# Patient Record
Sex: FEMALE | Race: WHITE | HISPANIC OR LATINO | URBAN - METROPOLITAN AREA
[De-identification: names, ages, dates, MRNs, and addresses within clinical notes are randomized per-mention and may not be internally consistent; named-entity substitution may affect disease eponyms.]

---

## 2019-12-27 ENCOUNTER — EMERGENCY (EMERGENCY)
Facility: HOSPITAL | Age: 39
LOS: 1 days | Discharge: HOME | End: 2019-12-27
Attending: EMERGENCY MEDICINE | Admitting: EMERGENCY MEDICINE
Payer: SELF-PAY

## 2019-12-27 VITALS
SYSTOLIC BLOOD PRESSURE: 199 MMHG | HEART RATE: 82 BPM | OXYGEN SATURATION: 97 % | TEMPERATURE: 98 F | WEIGHT: 147.05 LBS | RESPIRATION RATE: 20 BRPM | HEIGHT: 62 IN | DIASTOLIC BLOOD PRESSURE: 94 MMHG

## 2019-12-27 VITALS
RESPIRATION RATE: 20 BRPM | OXYGEN SATURATION: 98 % | DIASTOLIC BLOOD PRESSURE: 87 MMHG | HEART RATE: 76 BPM | SYSTOLIC BLOOD PRESSURE: 153 MMHG

## 2019-12-27 VITALS
HEART RATE: 56 BPM | DIASTOLIC BLOOD PRESSURE: 95 MMHG | TEMPERATURE: 98 F | SYSTOLIC BLOOD PRESSURE: 159 MMHG | OXYGEN SATURATION: 97 % | WEIGHT: 147.93 LBS | RESPIRATION RATE: 17 BRPM | HEIGHT: 62 IN

## 2019-12-27 LAB
ALBUMIN SERPL ELPH-MCNC: 3.7 G/DL — SIGNIFICANT CHANGE UP (ref 3.3–5)
ALP SERPL-CCNC: 73 U/L — SIGNIFICANT CHANGE UP (ref 40–120)
ALT FLD-CCNC: 25 U/L — SIGNIFICANT CHANGE UP (ref 10–45)
ANION GAP SERPL CALC-SCNC: 11 MMOL/L — SIGNIFICANT CHANGE UP (ref 5–17)
AST SERPL-CCNC: 8 U/L — LOW (ref 10–40)
BILIRUB SERPL-MCNC: 0.2 MG/DL — SIGNIFICANT CHANGE UP (ref 0.2–1.2)
BUN SERPL-MCNC: 8 MG/DL — SIGNIFICANT CHANGE UP (ref 7–23)
CALCIUM SERPL-MCNC: 9 MG/DL — SIGNIFICANT CHANGE UP (ref 8.4–10.5)
CHLORIDE SERPL-SCNC: 106 MMOL/L — SIGNIFICANT CHANGE UP (ref 96–108)
CO2 SERPL-SCNC: 25 MMOL/L — SIGNIFICANT CHANGE UP (ref 22–31)
CREAT SERPL-MCNC: 0.98 MG/DL — SIGNIFICANT CHANGE UP (ref 0.5–1.3)
D DIMER BLD IA.RAPID-MCNC: <150 NG/ML DDU — SIGNIFICANT CHANGE UP
GLUCOSE SERPL-MCNC: 133 MG/DL — HIGH (ref 70–99)
HCT VFR BLD CALC: 37 % — SIGNIFICANT CHANGE UP (ref 34.5–45)
HGB BLD-MCNC: 12.4 G/DL — SIGNIFICANT CHANGE UP (ref 11.5–15.5)
MCHC RBC-ENTMCNC: 28.5 PG — SIGNIFICANT CHANGE UP (ref 27–34)
MCHC RBC-ENTMCNC: 33.5 GM/DL — SIGNIFICANT CHANGE UP (ref 32–36)
MCV RBC AUTO: 85.1 FL — SIGNIFICANT CHANGE UP (ref 80–100)
NRBC # BLD: 0 /100 WBCS — SIGNIFICANT CHANGE UP (ref 0–0)
PLATELET # BLD AUTO: 292 K/UL — SIGNIFICANT CHANGE UP (ref 150–400)
POTASSIUM SERPL-MCNC: 3.2 MMOL/L — LOW (ref 3.5–5.3)
POTASSIUM SERPL-SCNC: 3.2 MMOL/L — LOW (ref 3.5–5.3)
PROT SERPL-MCNC: 7.2 G/DL — SIGNIFICANT CHANGE UP (ref 6–8.3)
RBC # BLD: 4.35 M/UL — SIGNIFICANT CHANGE UP (ref 3.8–5.2)
RBC # FLD: 12.6 % — SIGNIFICANT CHANGE UP (ref 10.3–14.5)
SODIUM SERPL-SCNC: 142 MMOL/L — SIGNIFICANT CHANGE UP (ref 135–145)
TROPONIN I SERPL-MCNC: <.017 NG/ML — LOW (ref 0.02–0.06)
WBC # BLD: 10.67 K/UL — HIGH (ref 3.8–10.5)
WBC # FLD AUTO: 10.67 K/UL — HIGH (ref 3.8–10.5)

## 2019-12-27 PROCEDURE — 85379 FIBRIN DEGRADATION QUANT: CPT

## 2019-12-27 PROCEDURE — 99285 EMERGENCY DEPT VISIT HI MDM: CPT

## 2019-12-27 PROCEDURE — 71046 X-RAY EXAM CHEST 2 VIEWS: CPT | Mod: 26

## 2019-12-27 PROCEDURE — 93005 ELECTROCARDIOGRAM TRACING: CPT

## 2019-12-27 PROCEDURE — 99284 EMERGENCY DEPT VISIT MOD MDM: CPT | Mod: 25

## 2019-12-27 PROCEDURE — 71046 X-RAY EXAM CHEST 2 VIEWS: CPT

## 2019-12-27 PROCEDURE — 84484 ASSAY OF TROPONIN QUANT: CPT

## 2019-12-27 PROCEDURE — 85027 COMPLETE CBC AUTOMATED: CPT

## 2019-12-27 PROCEDURE — 80053 COMPREHEN METABOLIC PANEL: CPT

## 2019-12-27 PROCEDURE — 99284 EMERGENCY DEPT VISIT MOD MDM: CPT

## 2019-12-27 PROCEDURE — 93010 ELECTROCARDIOGRAM REPORT: CPT

## 2019-12-27 PROCEDURE — 99053 MED SERV 10PM-8AM 24 HR FAC: CPT

## 2019-12-27 RX ORDER — METOPROLOL TARTRATE 50 MG
50 TABLET ORAL ONCE
Refills: 0 | Status: COMPLETED | OUTPATIENT
Start: 2019-12-27 | End: 2019-12-27

## 2019-12-27 RX ORDER — METOPROLOL TARTRATE 50 MG
1 TABLET ORAL
Qty: 30 | Refills: 0
Start: 2019-12-27 | End: 2020-01-10

## 2019-12-27 RX ORDER — ASPIRIN/CALCIUM CARB/MAGNESIUM 324 MG
162 TABLET ORAL DAILY
Refills: 0 | Status: DISCONTINUED | OUTPATIENT
Start: 2019-12-27 | End: 2020-01-11

## 2019-12-27 RX ADMIN — Medication 162 MILLIGRAM(S): at 01:10

## 2019-12-27 RX ADMIN — Medication 50 MILLIGRAM(S): at 01:10

## 2019-12-27 NOTE — ED ADULT TRIAGE NOTE - PRO INTERPRETER NEED 2
Vincentian [FreeTextEntry1] : Complete pulmonary function tests show normal spirometry, lung volumes and flow-volume loop. Diffusing capacity is normal.

## 2019-12-27 NOTE — ED PROVIDER NOTE - OBJECTIVE STATEMENT
pt reports intermittent chest pressure, sob, tingling in the chest and abdomen and b/l hands, she gets this periodically for last several months but has had no evaluation or treatment, pt recently here from [place], reports no medical problems but has not seen a doctor in her country.

## 2019-12-27 NOTE — ED ADULT NURSE NOTE - OBJECTIVE STATEMENT
Pt is alert, came to the ER due to nausea and epigastric pain since 5 to 6 days now. Pt was seen here yesterday for the same problem, was prescribed Metoprolol for the BP.

## 2019-12-27 NOTE — ED ADULT TRIAGE NOTE - CHIEF COMPLAINT QUOTE
Pt seen here last night, stated she is not feeling better, gets epigastric pain when she is unable to burp, also stated her BP is still elevated

## 2019-12-27 NOTE — ED PROVIDER NOTE - NSFOLLOWUPCLINICS_GEN_ALL_ED_FT
[facility] Practice Clinic  Family Medicine  [address] [place] [id number]  Phone: [phone]  Fax:   Follow Up Time:

## 2019-12-27 NOTE — ED ADULT NURSE NOTE - NSIMPLEMENTINTERV_GEN_ALL_ED
Implemented All Universal Safety Interventions:  [place] to call system. Call bell, personal items and telephone within reach. Instruct patient to call for assistance. Room bathroom lighting operational. Non-slip footwear when patient is off stretcher. Physically safe environment: no spills, clutter or unnecessary equipment. Stretcher in lowest position, wheels locked, appropriate side rails in place.

## 2019-12-27 NOTE — ED PROVIDER NOTE - PATIENT PORTAL LINK FT
You can access the FollowMyHealth Patient Portal offered by [facility] by registering at the following website: [email or web address]. By joining [facility]’s FollowMyHealth portal, you will also be able to view your health information using other applications (apps) compatible with our system.

## 2019-12-27 NOTE — ED ADULT TRIAGE NOTE - CHIEF COMPLAINT QUOTE
Pt presents to the ER complaining of chest pressure that started since Monday. Pt states that she feels pressure on her chest followed by SOB and abdominal pain. Pt is unable to take deep breaths after this episodes that last about 30 min. Pt with PMH hypertension.

## 2019-12-28 VITALS
SYSTOLIC BLOOD PRESSURE: 145 MMHG | OXYGEN SATURATION: 100 % | DIASTOLIC BLOOD PRESSURE: 85 MMHG | HEART RATE: 70 BPM | RESPIRATION RATE: 16 BRPM

## 2019-12-28 PROBLEM — I10 ESSENTIAL (PRIMARY) HYPERTENSION: Chronic | Status: ACTIVE | Noted: 2019-12-27

## 2019-12-28 LAB
ANION GAP SERPL CALC-SCNC: 11 MMOL/L — SIGNIFICANT CHANGE UP (ref 5–17)
BUN SERPL-MCNC: 10 MG/DL — SIGNIFICANT CHANGE UP (ref 7–23)
CALCIUM SERPL-MCNC: 8.7 MG/DL — SIGNIFICANT CHANGE UP (ref 8.4–10.5)
CHLORIDE SERPL-SCNC: 108 MMOL/L — SIGNIFICANT CHANGE UP (ref 96–108)
CO2 SERPL-SCNC: 25 MMOL/L — SIGNIFICANT CHANGE UP (ref 22–31)
CREAT SERPL-MCNC: 0.57 MG/DL — SIGNIFICANT CHANGE UP (ref 0.5–1.3)
GLUCOSE SERPL-MCNC: 106 MG/DL — HIGH (ref 70–99)
HCT VFR BLD CALC: 39 % — SIGNIFICANT CHANGE UP (ref 34.5–45)
HGB BLD-MCNC: 12.7 G/DL — SIGNIFICANT CHANGE UP (ref 11.5–15.5)
MCHC RBC-ENTMCNC: 28.1 PG — SIGNIFICANT CHANGE UP (ref 27–34)
MCHC RBC-ENTMCNC: 32.6 GM/DL — SIGNIFICANT CHANGE UP (ref 32–36)
MCV RBC AUTO: 86.3 FL — SIGNIFICANT CHANGE UP (ref 80–100)
NRBC # BLD: 0 /100 WBCS — SIGNIFICANT CHANGE UP (ref 0–0)
PLATELET # BLD AUTO: 302 K/UL — SIGNIFICANT CHANGE UP (ref 150–400)
POTASSIUM SERPL-MCNC: 3.6 MMOL/L — SIGNIFICANT CHANGE UP (ref 3.5–5.3)
POTASSIUM SERPL-SCNC: 3.6 MMOL/L — SIGNIFICANT CHANGE UP (ref 3.5–5.3)
RBC # BLD: 4.52 M/UL — SIGNIFICANT CHANGE UP (ref 3.8–5.2)
RBC # FLD: 12.4 % — SIGNIFICANT CHANGE UP (ref 10.3–14.5)
SODIUM SERPL-SCNC: 144 MMOL/L — SIGNIFICANT CHANGE UP (ref 135–145)
TROPONIN I SERPL-MCNC: <.017 NG/ML — LOW (ref 0.02–0.06)
WBC # BLD: 9.82 K/UL — SIGNIFICANT CHANGE UP (ref 3.8–10.5)
WBC # FLD AUTO: 9.82 K/UL — SIGNIFICANT CHANGE UP (ref 3.8–10.5)

## 2019-12-28 PROCEDURE — 36415 COLL VENOUS BLD VENIPUNCTURE: CPT

## 2019-12-28 PROCEDURE — 80048 BASIC METABOLIC PNL TOTAL CA: CPT

## 2019-12-28 PROCEDURE — 85027 COMPLETE CBC AUTOMATED: CPT

## 2019-12-28 PROCEDURE — 84484 ASSAY OF TROPONIN QUANT: CPT

## 2019-12-28 PROCEDURE — 99284 EMERGENCY DEPT VISIT MOD MDM: CPT | Mod: 25

## 2019-12-28 PROCEDURE — 93010 ELECTROCARDIOGRAM REPORT: CPT

## 2019-12-28 PROCEDURE — 93005 ELECTROCARDIOGRAM TRACING: CPT

## 2019-12-28 RX ORDER — FAMOTIDINE 10 MG/ML
20 INJECTION INTRAVENOUS ONCE
Refills: 0 | Status: COMPLETED | OUTPATIENT
Start: 2019-12-28 | End: 2019-12-28

## 2019-12-28 RX ORDER — OMEPRAZOLE 10 MG/1
1 CAPSULE, DELAYED RELEASE ORAL
Qty: 30 | Refills: 0
Start: 2019-12-28

## 2019-12-28 RX ORDER — LIDOCAINE 4 G/100G
10 CREAM TOPICAL ONCE
Refills: 0 | Status: COMPLETED | OUTPATIENT
Start: 2019-12-28 | End: 2019-12-28

## 2019-12-28 RX ADMIN — Medication 30 MILLILITER(S): at 00:20

## 2019-12-28 RX ADMIN — FAMOTIDINE 20 MILLIGRAM(S): 10 INJECTION INTRAVENOUS at 00:20

## 2019-12-28 RX ADMIN — LIDOCAINE 10 MILLILITER(S): 4 CREAM TOPICAL at 00:18

## 2019-12-28 NOTE — ED PROVIDER NOTE - OBJECTIVE STATEMENT
pt c/o epigastric pain, mild, pressure, bloating for about 10days, assoc c sob, worsens after eating. no nausea/voimting. no fever/cough. no leg pain/swelling /recent periods of immobility. no black/bloody stools. was seen here for same sxs yesterday.  sxs persisting, unchanged. was prescribed metoprolol which pt has started

## 2019-12-28 NOTE — ED PROVIDER NOTE - CARE PLAN
Principal Discharge DX:	Epigastric pain  Secondary Diagnosis:	Dyspnea, unspecified type Principal Discharge DX:	Epigastric pain  Secondary Diagnosis:	Dyspnea, unspecified type  Secondary Diagnosis:	PVC (premature ventricular contraction)

## 2019-12-28 NOTE — ED PROVIDER NOTE - CARE PROVIDER_API CALL
Jovany Williamson)  Cardiology; Internal Medicine  [address] [facility], [address]  [place] [address]  Phone: [phone]  Fax: [phone]  Follow Up Time: 1-3 Days

## 2019-12-28 NOTE — ED PROVIDER NOTE - CLINICAL SUMMARY MEDICAL DECISION MAKING FREE TEXT BOX
epigastric pain, sob, worse c eating. no cardiac risk factors. possible gastritis, GERD.  w/u neg yesterday for PE, acs, LFT's normal. will treat for gerd/gastritis. give pepcid, maalox,  will rx omeprazole.  recheck EKG    update: pt feeling better p meds. ekg today changed, shows frequent PVC, bigeminy like pattern.  lytes, trop rechecked, normal.  pt stable. no sxs.  continue metoprolol. f/u pmd and cardiology

## 2019-12-28 NOTE — ED PROVIDER NOTE - PATIENT PORTAL LINK FT
You can access the FollowMyHealth Patient Portal offered by [facility] by registering at the following website: [email or web address]. By joining [email or web address]’s FollowMyHealth portal, you will also be able to view your health information using other applications (apps) compatible with our system. You can access the FollowMyHealth Patient Portal offered by [facility] by registering at the following website: [email or web address]. By joining [facility]’s FollowMyHealth portal, you will also be able to view your health information using other applications (apps) compatible with our system.

## 2019-12-28 NOTE — ED PROVIDER NOTE - NSFOLLOWUPCLINICS_GEN_ALL_ED_FT
Family Practice Clinic  Family Medicine  [address] [place] [id number]  Phone: [phone]  Fax:   Follow Up Time: 1-3 Days

## 2019-12-28 NOTE — ED PROVIDER NOTE - NSDCPRINTRESULTS_ED_ALL_ED
Here is the number for the coumadin clinic:  [phone].    Thank you for your visit today.  Please call me with any questions or concerns.   Carloz Wilson RN  Cardiology Care Coordinator  [phone], press option 1 then option 3    
Patient requests all Lab and Radiology Results on their Discharge Instructions

## 2019-12-28 NOTE — ED PROVIDER NOTE - NSFOLLOWUPINSTRUCTIONS_ED_ALL_ED_FT
-take omeprazole daily    Falta de aliento    LO QUE NECESITA SABER:    La falta de aliento es la sensación de que no puede obtener suficiente aire cuando respira. Usted puede tener ángel sentimiento solo quiana la actividad, o todo el tiempo. Los síntomas pueden variar de leves a severos. La falta de aliento puede ser un signo de hung condición de georgina grave que requiere atención inmediata.    INSTRUCCIONES SOBRE [facility]:    Regrese a la sasha de emergencias si:    Ayaka signos y síntomas están igual o empeoran en las siguientes 24 horas del tratamiento.      La piel sobre ayaka costillas o en reyes anisha se hunden cuando usted respira.      Usted se siente confundido o mareado    Comuníquese con reyes médico si:    Usted tiene nuevos síntomas o ayaka síntomas empeoran.      Usted tiene preguntas o inquietudes acerca de reyes condición o cuidado.    Medicamentos:    Los medicamentosLos medicamentos se pueden usar para encontrar la causa de los síntomas. Usted podría necesitar medicamentos para tratar hung infección bacteriana o para reducir la ansiedad. Otros medicamentos pueden utilizarse para abrir las vías respiratorias, desinflamar o quitar líquido extra. Si usted tiene hung condición del corazón, puede necesitar medicamentos para ayudar a que reyes corazón palpite más astrid o regularmente.      [place] ayaka medicamentos john se le haya indicado.Consulte con reyes médico si usted lebron que reyes medicamento no le está ayudando o si presenta efectos secundarios. Infórmele si es alérgico a cualquier medicamento. Mantenga hung lista actualizada de los medicamentos, las vitaminas y los productos herbales que tala. Incluya los siguientes datos de los medicamentos: cantidad, frecuencia y motivo de administración. Traiga con usted la lista o los envases de las píldoras a ayaka citas de seguimiento. Lleve la lista de los medicamentos con usted en tom de hung emergencia.    Maneje la falta de aliento:    El[place]ore un plan de acción.Usted y reyes médico pueden trabajar juntos a fin de crear un plan para manejar la falta de aliento. El plan puede incluir las actividades diarias, cambios de tratamiento y qué hacer si usted tiene problemas respiratorios graves.      Al jos asiento inclínese hacia adelante en ayaka codos.[place] ayuda a expandir los pulmones y puede que le sea más fácil respirar.      Use la respiración con los labios fruncidos cada vez que se sienta corto de respiración.Respire por la nariz y muy despacio exhale por reyes boca con los labios ligeramente fruncidos o en forma de ''U''. Se debería demorar el doble de tiempo al expulsar el aire de lo que le michelle en inhalarlo.Inhalar y exhalar           No fume.La nicotina y otras sustancias químicas que contienen los cigarrillos y puros pueden causar daño pulmonar y empeorar la falta de aliento. Pida información a reyes médico si usted actualmente fuma y necesita ayuda para dejar de fumar. Los cigarrillos electrónicos o el tabaco sin humo igualmente contienen nicotina. Consulte con reyes médico antes de utilizar estos productos.      [place] o mantenga un peso saludable.Reyes médico le puede ayudar a elaborar un plan para perder peso de hung forma ralph si usted tiene sobrepeso.      Ejercítese según indicaciones.El ejercicio puede ayudar a los pulmones a trabajar más fácilmente. El ejercicio también puede ayudar a perder peso, si es necesario. Trate de hacer unos 30 minutos de ejercicio la mayoría de los días de la semana.    Kasandra hung adily de control con reyes médico o especialista john se lo indicaron:Anote ayaka preguntas para que se acuerde de hacerlas quiana ayaka visitas.    Dolor epigástrico    LO QUE NECESITA SABER:    El dolor epigástrico se siente en la parte media superior del abdomen entre las costillas y el ombligo. El dolor puede ser leve o intenso. El dolor se puede propagar de un lado a otro lugar del cuerpo. El dolor epigástrico puede ser hung señal de un problema grave que necesita recibir tratamiento.    INSTRUCCIONES SOBRE [facility]ITAL[facility]IA:    Llame al 911 en tom de presentar lo siguiente:    Tiene alguno de los siguientes signos de un ataque cardíaco:   Estrujamiento, presión o tensión en reyes pecho      Usted también podría presentar alguno de los siguientes:   Malestar o dolor en reyes espalda, anisha, mandíbula, abdomen, o brazo      Falta de aliento      Náuseas o vómitos      Desvanecimiento o sudor frío repentino      Usted tiene un astrid dolor que se irradia a reyes mandíbula o a reyes espalda.    Regrese a la sasha de emergencias si:    Usted tiene un astrid dolor que empieza de repente y empeora rápidamente.      Usted no puede tener hung evacuación intestinal y está vomitando.      Usted vomita o expectora dariel.      Usted nota dariel en reyes orina o en ayaka deposiciones.      Usted está somnoliento y reyes respiración es más lenta que lo usual.    Comuníquese con reyes médico si:    Usted tiene fiebre o escalofríos.      Se le ponen de color amarillo la piel o el díaz de los ojos.      Usted vomita con frecuencia, o muchas veces de seguido.      Usted pierde peso sin proponérselo.      Usted tiene síntomas que overton más de 2 semanas.      Usted tiene preguntas o inquietudes acerca de reyes condición o cuidado.    Medicamentos:    Los medicamentospara tratar el dolor o para detener el vómito. Es posible que también necesite medicamentos para reducir o controlar el ácido estomacal o para tratar hung infección.      [place] ayaka medicamentos john se le haya indicado.Consulte con reyes médico si usted lebron que reyes medicamento no le está ayudando o si presenta efectos secundarios. Infórmele si es alérgico a algún medicamento. Mantenga hung lista actualizada de los medicamentos, las vitaminas y los productos herbales que tala. Incluya los siguientes datos de los medicamentos: cantidad, frecuencia y motivo de administración. Traiga con usted la lista o los envases de las píldoras a ayaka citas de seguimiento. Lleve la lista de los medicamentos con usted en tom de hung emergencia.    Acuda a ayaka consultas de control con reyes médico según le indicaron.Anote ayaka preguntas para que se acuerde de hacerlas quiana ayaka visitas.    El manejo de ayaka síntomas:    Lleve un registro de ayaka síntomas.Incluya cuándo empieza el dolor, cuánto dura y sí el dolor es saleem o leve. También incluya todos los alimentos que consume o las actividades que estaba haciendo antes que el dolor empezara. Registre cualquier cosa que haya ayudado para aliviar el dolor.      Consuma alimentos saludables y variados.Los alimentos saludables incluyen frutas, verduras, pan integral, productos lácteos bajos en grasa, frijoles, nikki magras y pescado. Pregunte si necesita seguir hung dieta especial. Ciertos alimentos pueden causar reyes dolor, john el alcohol o alimentos que son muy grasosos. Es posible que necesite comer comidas más pequeñas y con más frecuencia que lo habitual.      [place] líquidos john se le haya indicado.Pregunte cuánto líquido debe jos cada día y cuáles líquidos son los más adecuados para usted. No consuma bebidas que contengan alcohol o cafeína. -take omeprazole daily    Contracciones ventriculares prematuras    LO QUE NECESITA SABER:    Las contracciones ventriculares prematuras son unas interrupciones del ritmo cardíaco. Las contracciones son causadas por la emisión de hung señal temprana que hace bombear al corazón. El riesgo de las contracciones ventriculares prematuras aumenta cuando usted consume alcohol o cafeína, o si se siente cansado o estresado. Es muy importante que usted acuda a las consultas de control con reyes médico para que la causa de ayaka contracciones ventriculares prematuras (CVP) se puedan diagnosticar y tratar.    INSTRUCCIONES SOBRE [facility]ITAL[facility]IA:    Llame al 911 si:    Tiene alguno de los siguientes signos de un ataque cardíaco:   Estrujamiento, presión o tensión en reyes pecho      Usted también podría presentar alguno de los siguientes:   Malestar o dolor en reyes espalda, anisha, mandíbula, abdomen, o brazo      Falta de aliento      Náuseas o vómitos      Desvanecimiento o sudor frío repentino        Comuníquese con reyes médico si:    Usted todavía tiene síntomas después del tratamiento o ayaka síntomas empeoran.      Usted tiene preguntas o inquietudes acerca de reyes condición o cuidado.    Medicamentos:    Un medicamento para el corazónse puede administrar para hacer que los latidos de reyes corazón tengan hung frecuencia y ritmo regular.      [place] ayaka medicamentos john se le haya indicado.Consulte con reyes médico si usted lebron que reyes medicamento no le está ayudando o si presenta efectos secundarios. Infórmele si es alérgico a cualquier medicamento. Mantenga hung lista actualizada de los medicamentos, las vitaminas y los productos herbales que tala. Incluya los siguientes datos de los medicamentos: cantidad, frecuencia y motivo de administración. Traiga con usted la lista o los envases de las píldoras a ayaka citas de seguimiento. Lleve la lista de los medicamentos con usted en tom de hung emergencia.    Acuda a ayaka consultas de control con reyes médico según le indicaron.Usted podría necesitar otro electrocardiograma (ECG) quiana los primeros 10 días y más exámenes quiana los siguientes 12 meses. Anote ayaka preguntas para que se acuerde de hacerlas quiana ayaka visitas.        Falta de aliento    LO QUE NECESITA SABER:    La falta de aliento es la sensación de que no puede obtener suficiente aire cuando respira. Usted puede tener ángel sentimiento solo quiana la actividad, o todo el tiempo. Los síntomas pueden variar de leves a severos. La falta de aliento puede ser un signo de hung condición de georgina grave que requiere atención inmediata.    INSTRUCCIONES SOBRE [facility]ITALARIA:    Regrese a la sasha de emergencias si:    Ayaka signos y síntomas están igual o empeoran en las siguientes 24 horas del tratamiento.      La piel sobre ayaka costillas o en reyes anisha se hunden cuando usted respira.      Usted se siente confundido o mareado    Comuníquese con reyes médico si:    Usted tiene nuevos síntomas o ayaka síntomas empeoran.      Usted tiene preguntas o inquietudes acerca de reyes condición o cuidado.    Medicamentos:    Los medicamentosLos medicamentos se pueden usar para encontrar la causa de los síntomas. Usted podría necesitar medicamentos para tratar hung infección bacteriana o para reducir la ansiedad. Otros medicamentos pueden utilizarse para abrir las vías respiratorias, desinflamar o quitar líquido extra. Si usted tiene hung condición del corazón, puede necesitar medicamentos para ayudar a que reyes corazón palpite más astrid o regularmente.      [place] ayaka medicamentos john se le haya indicado.Consulte con reyes médico si usted lebrno que reyes medicamento no le está ayudando o si presenta efectos secundarios. Infórmele si es alérgico a cualquier medicamento. Mantenga hung lista actualizada de los medicamentos, las vitaminas y los productos herbales que tala. Incluya los siguientes datos de los medicamentos: cantidad, frecuencia y motivo de administración. Traiga con usted la lista o los envases de las píldoras a ayaka citas de seguimiento. Lleve la lista de los medicamentos con usted en tom de hung emergencia.    Maneje la falta de aliento:    Elabore un plan de acción.Usted y reyes médico pueden trabajar juntos a fin de crear un plan para manejar la falta de aliento. El plan puede incluir las actividades diarias, cambios de tratamiento y qué hacer si usted tiene problemas respiratorios graves.      Al jos asiento inclínese hacia adelante en ayaka codos.[place] ayuda a expandir los pulmones y puede que le sea más fácil respirar.      Use la respiración con los labios fruncidos cada vez que se sienta corto de respiración.Respire por la nariz y muy despacio exhale por reyes boca con los labios ligeramente fruncidos o en forma de ''U''. Se debería demorar el doble de tiempo al expulsar el aire de lo que le michelle en inhalarlo.Inhalar y exhalar           No fume.La nicotina y otras sustancias químicas que contienen los cigarrillos y puros pueden causar daño pulmonar y empeorar la falta de aliento. Pida información a reyes médico si usted actualmente fuma y necesita ayuda para dejar de fumar. Los cigarrillos electrónicos o el tabaco sin humo igualmente contienen nicotina. Consulte con reyes médico antes de utilizar estos productos.      [place] o mantenga un peso saludable.Reyes médico le puede ayudar a elaborar un plan para perder peso de hung forma ralph si usted tiene sobrepeso.      Ejercítese según indicaciones.El ejercicio puede ayudar a los pulmones a trabajar más fácilmente. El ejercicio también puede ayudar a perder peso, si es necesario. Trate de hacer unos 30 minutos de ejercicio la mayoría de los días de la semana.    Kasandra hung daily de control con reyes médico o especialista john se lo indicaron:Anote ayaka preguntas para que se acuerde de hacerlas quiana ayaka visitas.    Dolor epigástrico    LO QUE NECESITA SABER:    El dolor epigástrico se siente en la parte media superior del abdomen entre las costillas y el ombligo. El dolor puede ser leve o intenso. El dolor se puede propagar de un lado a otro lugar del cuerpo. El dolor epigástrico puede ser hung señal de un problema grave que necesita recibir tratamiento.    INSTRUCCIONES SOBRE [facility]ITALARIA:    Llame al 911 en tom de presentar lo siguiente:    Tiene alguno de los siguientes signos de un ataque cardíaco:   Estrujamiento, presión o tensión en reyes pecho      Usted también podría presentar alguno de los siguientes:   Malestar o dolor en reyes espalda, anisha, mandíbula, abdomen, o brazo      Falta de aliento      Náuseas o vómitos      Desvanecimiento o sudor frío repentino      Usted tiene un astrid dolor que se irradia a reyes mandíbula o a reyes espalda.    Regrese a la sasha de emergencias si:    Usted tiene un astrid dolor que empieza de repente y empeora rápidamente.      Usted no puede tener hung evacuación intestinal y está vomitando.      Usted vomita o expectora dariel.      Usted nota dariel en reyes orina o en ayaka deposiciones.      Usted está somnoliento y reyes respiración es más lenta que lo usual.    Comuníquese con reyes médico si:    Usted tiene fiebre o escalofríos.      Se le ponen de color amarillo la piel o el díaz de los ojos.      Usted vomita con frecuencia, o muchas veces de seguido.      Usted pierde peso sin proponérselo.      Usted tiene síntomas que overton más de 2 semanas.      Usted tiene preguntas o inquietudes acerca de reyes condición o cuidado.    Medicamentos:    Los medicamentospara tratar el dolor o para detener el vómito. Es posible que también necesite medicamentos para reducir o controlar el ácido estomacal o para tratar hung infección.      [place] ayaka medicamentos john se le haya indicado.Consulte con reyes médico si usted elbron que reyes medicamento no le está ayudando o si presenta efectos secundarios. Infórmele si es alérgico a algún medicamento. Mantenga hung lista actualizada de los medicamentos, las vitaminas y los productos herbales que tala. Incluya los siguientes datos de los medicamentos: cantidad, frecuencia y motivo de administración. Traiga con usted la lista o los envases de las píldoras a ayaka citas de seguimiento. Lleve la lista de los medicamentos con usted en tom de hung emergencia.    Acuda a ayaka consultas de control con reyes médico según le indicaron.Anote ayaka preguntas para que se acuerde de hacerlas quiana ayaka visitas.    El manejo de ayaka síntomas:    Lleve un registro de ayaka síntomas.Incluya cuándo empieza el dolor, cuánto dura y sí el dolor es saleem o leve. También incluya todos los alimentos que consume o las actividades que estaba haciendo antes que el dolor empezara. Registre cualquier cosa que haya ayudado para aliviar el dolor.      Consuma alimentos saludables y variados.Los alimentos saludables incluyen frutas, verduras, pan integral, productos lácteos bajos en grasa, frijoles, nikki magras y pescado. Pregunte si necesita seguir hung dieta especial. Ciertos alimentos pueden causar reyes dolor, john el alcohol o alimentos que son muy grasosos. Es posible que necesite comer comidas más pequeñas y con más frecuencia que lo habitual.      [place] líquidos john se le haya indicado.Pregunte cuánto líquido debe jos cada día y cuáles líquidos son los más adecuados para usted. No consuma bebidas que contengan alcohol o cafeína.

## 2020-01-01 NOTE — ED POST DISCHARGE NOTE - RESULT SUMMARY
Pt noted to have pulm nodule on chest xray, o/p Ct chest recommended by radiology - pt contacted, spoke with her , advised on the finding and need of CT, pt has appoitment with Family medicine clinic on 1/2, will discuss the results with pcp;

## 2020-01-02 ENCOUNTER — MED ADMIN CHARGE (OUTPATIENT)
Age: 40
End: 2020-01-02

## 2020-01-02 ENCOUNTER — OUTPATIENT (OUTPATIENT)
Dept: OUTPATIENT SERVICES | Facility: HOSPITAL | Age: 40
LOS: 1 days | End: 2020-01-02
Payer: SELF-PAY

## 2020-01-02 ENCOUNTER — APPOINTMENT (OUTPATIENT)
Dept: FAMILY MEDICINE | Facility: HOSPITAL | Age: 40
End: 2020-01-02

## 2020-01-02 VITALS
RESPIRATION RATE: 16 BRPM | HEIGHT: 62 IN | TEMPERATURE: 97.8 F | BODY MASS INDEX: 26.5 KG/M2 | DIASTOLIC BLOOD PRESSURE: 93 MMHG | WEIGHT: 144 LBS | OXYGEN SATURATION: 99 % | HEART RATE: 76 BPM | SYSTOLIC BLOOD PRESSURE: 138 MMHG

## 2020-01-02 DIAGNOSIS — Z82.49 FAMILY HISTORY OF ISCHEMIC HEART DISEASE AND OTHER DISEASES OF THE CIRCULATORY SYSTEM: ICD-10-CM

## 2020-01-02 DIAGNOSIS — Z78.9 OTHER SPECIFIED HEALTH STATUS: ICD-10-CM

## 2020-01-02 DIAGNOSIS — Z83.438 FAMILY HISTORY OF OTHER DISORDER OF LIPOPROTEIN METABOLISM AND OTHER LIPIDEMIA: ICD-10-CM

## 2020-01-02 DIAGNOSIS — Z00.00 ENCOUNTER FOR GENERAL ADULT MEDICAL EXAMINATION WITHOUT ABNORMAL FINDINGS: ICD-10-CM

## 2020-01-02 DIAGNOSIS — Z83.3 FAMILY HISTORY OF DIABETES MELLITUS: ICD-10-CM

## 2020-01-02 NOTE — COUNSELING
[Fall prevention counseling provided] : Fall prevention counseling provided [Adequate lighting] : Adequate lighting [No throw rugs] : No throw rugs [Behavioral health counseling provided] : Behavioral health counseling provided [Use proper foot wear] : Use proper foot wear [Engage in a relaxing activity] : Engage in a relaxing activity [Sleep ___ hours/day] : Sleep [unfilled] hours/day [Plan in advance] : Plan in advance

## 2020-01-03 DIAGNOSIS — R91.1 SOLITARY PULMONARY NODULE: ICD-10-CM

## 2020-01-03 DIAGNOSIS — Z09 ENCOUNTER FOR FOLLOW-UP EXAMINATION AFTER COMPLETED TREATMENT FOR CONDITIONS OTHER THAN MALIGNANT NEOPLASM: ICD-10-CM

## 2020-01-03 DIAGNOSIS — R42 DIZZINESS AND GIDDINESS: ICD-10-CM

## 2020-01-06 ENCOUNTER — FORM ENCOUNTER (OUTPATIENT)
Age: 40
End: 2020-01-06

## 2020-01-07 ENCOUNTER — OUTPATIENT (OUTPATIENT)
Dept: OUTPATIENT SERVICES | Facility: HOSPITAL | Age: 40
LOS: 1 days | End: 2020-01-07
Payer: SELF-PAY

## 2020-01-07 DIAGNOSIS — R91.1 SOLITARY PULMONARY NODULE: ICD-10-CM

## 2020-01-07 DIAGNOSIS — Z29.9 ENCOUNTER FOR PROPHYLACTIC MEASURES, UNSPECIFIED: ICD-10-CM

## 2020-01-07 PROCEDURE — 71250 CT THORAX DX C-: CPT

## 2020-01-07 PROCEDURE — G0463: CPT

## 2020-01-07 PROCEDURE — 93306 TTE W/DOPPLER COMPLETE: CPT

## 2020-01-07 PROCEDURE — 83036 HEMOGLOBIN GLYCOSYLATED A1C: CPT

## 2020-01-07 PROCEDURE — 93227 XTRNL ECG REC<48 HR R&I: CPT

## 2020-01-07 PROCEDURE — 71250 CT THORAX DX C-: CPT | Mod: 26

## 2020-01-07 PROCEDURE — 93225 XTRNL ECG REC<48 HRS REC: CPT

## 2020-01-07 PROCEDURE — 93306 TTE W/DOPPLER COMPLETE: CPT | Mod: 26

## 2020-01-07 PROCEDURE — 80053 COMPREHEN METABOLIC PANEL: CPT

## 2020-01-07 PROCEDURE — 80061 LIPID PANEL: CPT

## 2020-01-08 LAB
ALBUMIN SERPL ELPH-MCNC: 4.5 G/DL
ALP BLD-CCNC: 75 U/L
ALT SERPL-CCNC: 21 U/L
ANION GAP SERPL CALC-SCNC: 15 MMOL/L
AST SERPL-CCNC: 15 U/L
BASOPHILS # BLD AUTO: 0.05 K/UL
BASOPHILS NFR BLD AUTO: 0.7 %
BILIRUB SERPL-MCNC: 0.3 MG/DL
BUN SERPL-MCNC: 11 MG/DL
CALCIUM SERPL-MCNC: 9.3 MG/DL
CHLORIDE SERPL-SCNC: 105 MMOL/L
CHOLEST SERPL-MCNC: 161 MG/DL
CHOLEST/HDLC SERPL: 4.5 RATIO
CO2 SERPL-SCNC: 24 MMOL/L
CREAT SERPL-MCNC: 0.54 MG/DL
EOSINOPHIL # BLD AUTO: 0.11 K/UL
EOSINOPHIL NFR BLD AUTO: 1.5 %
ESTIMATED AVERAGE GLUCOSE: 108 MG/DL
GLUCOSE SERPL-MCNC: 101 MG/DL
HBA1C MFR BLD HPLC: 5.4 %
HCT VFR BLD CALC: 39.1 %
HDLC SERPL-MCNC: 36 MG/DL
HGB BLD-MCNC: 13.3 G/DL
IMM GRANULOCYTES NFR BLD AUTO: 0.1 %
LDLC SERPL CALC-MCNC: 105 MG/DL
LYMPHOCYTES # BLD AUTO: 1.44 K/UL
LYMPHOCYTES NFR BLD AUTO: 19.7 %
MAN DIFF?: NORMAL
MCHC RBC-ENTMCNC: 29.2 PG
MCHC RBC-ENTMCNC: 34 GM/DL
MCV RBC AUTO: 85.7 FL
MONOCYTES # BLD AUTO: 0.37 K/UL
MONOCYTES NFR BLD AUTO: 5.1 %
NEUTROPHILS # BLD AUTO: 5.34 K/UL
NEUTROPHILS NFR BLD AUTO: 72.9 %
PLATELET # BLD AUTO: 289 K/UL
POTASSIUM SERPL-SCNC: 4 MMOL/L
PROT SERPL-MCNC: 6.8 G/DL
RBC # BLD: 4.56 M/UL
RBC # FLD: 12.4 %
SODIUM SERPL-SCNC: 144 MMOL/L
TRIGL SERPL-MCNC: 98 MG/DL
WBC # FLD AUTO: 7.32 K/UL

## 2020-01-09 NOTE — HISTORY OF PRESENT ILLNESS
[FreeTextEntry2] : [place]  ID#: [id number]\par 39F w/HTN here for f/u s/p ED visit x2 ( and )for CP. Per chart review work up EKG and trops negative. Incidental finding of 5mm RUL nodule on CXR. Pt is here for f/u.\par Pt also states that she experienced dizziness, palpitations, with chest pain ~15 days ago. States lasted for about 5 minutes. States she felt sluggish afterwards for several minutes. No loss of bladder of bowel function at that time. Had a similar episode a few years ago but it resolved on its own w/o intervention. States recurred 15 days ago after that. \par \par PMHx: HTN\par LMP 2019; periods qmonthly, regular. Last Pap 2019 wnl\par OB: \par Psurg: Csection 2010\par Soc: never smoked cigarettes, denies ETOH or illicit drug use\par Famhx: Her father has DM and HTN, paternal Uncle and  Grandparents w/HLD. Maternal Aunt with Endometrial cancer\par Medication: Omeprazole 40mg qd\par Metoprolol Tartrate 25mg BID\par Allergies: Denies drug allergies\par \par Denies malaise, fevers/chills, headache, vision changes, SOB, CP/palpitations, N/V/D/C, abdominal pain, numbness/tingling/swelling in extremities.\par

## 2020-01-09 NOTE — PHYSICAL EXAM
[No Acute Distress] : no acute distress [Well Developed] : well developed [Well Nourished] : well nourished [PERRL] : pupils equal round and reactive to light [EOMI] : extraocular movements intact [Normal Outer Ear/Nose] : the outer ears and nose were normal in appearance [Normal Oropharynx] : the oropharynx was normal [No Lymphadenopathy] : no lymphadenopathy [Supple] : supple [No Respiratory Distress] : no respiratory distress  [No Accessory Muscle Use] : no accessory muscle use [Clear to Auscultation] : lungs were clear to auscultation bilaterally [Regular Rhythm] : with a regular rhythm [Normal Rate] : normal rate  [Soft] : abdomen soft [Normal S1, S2] : normal S1 and S2 [Non Tender] : non-tender [Non-distended] : non-distended [Normal Bowel Sounds] : normal bowel sounds [No Hernias] : no hernias [Coordination Grossly Intact] : coordination grossly intact [Normal Gait] : normal gait [Normal Affect] : the affect was normal [Normal Insight/Judgement] : insight and judgment were intact [de-identified] : +JVD [de-identified] : 2mm black stripe on sclera on R side ~8-10 o'cloc 2mm from iris

## 2020-01-09 NOTE — REVIEW OF SYSTEMS
[Fever] : no fever [Chills] : no chills [Palpitations] : no palpitations [Orthopnea] : no orthopnea [Chest Pain] : no chest pain [Abdominal Pain] : no abdominal pain [Nausea] : no nausea [Constipation] : no constipation [Diarrhea] : diarrhea [Vomiting] : no vomiting [Dysuria] : no dysuria [Suicidal] : not suicidal [Depression] : no depression

## 2020-01-16 ENCOUNTER — RESULT CHARGE (OUTPATIENT)
Age: 40
End: 2020-01-16

## 2020-01-17 ENCOUNTER — APPOINTMENT (OUTPATIENT)
Dept: CARDIOLOGY | Facility: HOSPITAL | Age: 40
End: 2020-01-17
Payer: COMMERCIAL

## 2020-01-17 ENCOUNTER — OUTPATIENT (OUTPATIENT)
Dept: OUTPATIENT SERVICES | Facility: HOSPITAL | Age: 40
LOS: 1 days | End: 2020-01-17
Payer: SELF-PAY

## 2020-01-17 VITALS
RESPIRATION RATE: 16 BRPM | SYSTOLIC BLOOD PRESSURE: 161 MMHG | TEMPERATURE: 97.8 F | DIASTOLIC BLOOD PRESSURE: 92 MMHG | WEIGHT: 149 LBS | HEART RATE: 78 BPM | OXYGEN SATURATION: 99 % | BODY MASS INDEX: 27.25 KG/M2

## 2020-01-17 DIAGNOSIS — Z00.00 ENCOUNTER FOR GENERAL ADULT MEDICAL EXAMINATION WITHOUT ABNORMAL FINDINGS: ICD-10-CM

## 2020-01-17 PROCEDURE — G0463: CPT

## 2020-01-17 PROCEDURE — 93005 ELECTROCARDIOGRAM TRACING: CPT

## 2020-01-17 PROCEDURE — 93010 ELECTROCARDIOGRAM REPORT: CPT

## 2020-01-21 DIAGNOSIS — R42 DIZZINESS AND GIDDINESS: ICD-10-CM

## 2020-01-21 DIAGNOSIS — R00.2 PALPITATIONS: ICD-10-CM

## 2020-02-12 ENCOUNTER — OUTPATIENT (OUTPATIENT)
Dept: OUTPATIENT SERVICES | Facility: HOSPITAL | Age: 40
LOS: 1 days | End: 2020-02-12
Payer: SELF-PAY

## 2020-02-12 ENCOUNTER — APPOINTMENT (OUTPATIENT)
Dept: FAMILY MEDICINE | Facility: HOSPITAL | Age: 40
End: 2020-02-12

## 2020-02-12 VITALS
SYSTOLIC BLOOD PRESSURE: 144 MMHG | OXYGEN SATURATION: 97 % | HEART RATE: 76 BPM | RESPIRATION RATE: 16 BRPM | TEMPERATURE: 97.9 F | WEIGHT: 146 LBS | BODY MASS INDEX: 26.7 KG/M2 | DIASTOLIC BLOOD PRESSURE: 88 MMHG

## 2020-02-12 VITALS — SYSTOLIC BLOOD PRESSURE: 140 MMHG | DIASTOLIC BLOOD PRESSURE: 80 MMHG

## 2020-02-12 DIAGNOSIS — Z00.00 ENCOUNTER FOR GENERAL ADULT MEDICAL EXAMINATION WITHOUT ABNORMAL FINDINGS: ICD-10-CM

## 2020-02-12 PROCEDURE — G0463: CPT

## 2020-02-12 NOTE — PHYSICAL EXAM
[No Acute Distress] : no acute distress [Well Nourished] : well nourished [No Accessory Muscle Use] : no accessory muscle use [Well Developed] : well developed [No Respiratory Distress] : no respiratory distress  [Normal S1, S2] : normal S1 and S2 [Clear to Auscultation] : lungs were clear to auscultation bilaterally [Regular Rhythm] : with a regular rhythm [Normal Rate] : normal rate

## 2020-02-12 NOTE — REVIEW OF SYSTEMS
[Fever] : no fever [Chills] : no chills [Chest Pain] : no chest pain [Shortness Of Breath] : no shortness of breath [Abdominal Pain] : no abdominal pain [Nausea] : no nausea [Constipation] : no constipation [Diarrhea] : diarrhea [Vomiting] : no vomiting [Heartburn] : no heartburn [Dysuria] : no dysuria

## 2020-02-12 NOTE — HISTORY OF PRESENT ILLNESS
[[employer] ] : provided by [employer]   [FreeTextEntry1] : [id number] [TWNoteComboBox1] : [place] [de-identified] : 39F w/palpitations found to have PVCs on halter monitor in clinic for follow up. \par TTE peformed, EF= 60 with grade I diastolic dysfunction \par Pt states she was able to  metoprolol, sx have improved. Has not had any coffee since 1/17/2020.\par States medication adherence. \par Extensive discussion of TTE and CT chest results. Pt denies hx of TB or known exposure to TB.\par Pt requests pulmonologist referral for further assessment. Implications of f/u discussed. Understanding assessed via teach back method. All other questions answered.\par

## 2020-02-13 DIAGNOSIS — R00.2 PALPITATIONS: ICD-10-CM

## 2020-03-24 ENCOUNTER — APPOINTMENT (OUTPATIENT)
Dept: FAMILY MEDICINE | Facility: HOSPITAL | Age: 40
End: 2020-03-24

## 2020-03-27 NOTE — ED ADULT TRIAGE NOTE - WEIGHT IN KG
Called patient as I received disability paperwork for him which we never discussed.   He confirms that it is to apply for disability. I informed him that I generally do not complete these, especially since we never discussed this. I let him know that I will complete it this one time from the time I saw him through today only.    66.7

## 2020-06-03 ENCOUNTER — OUTPATIENT (OUTPATIENT)
Dept: OUTPATIENT SERVICES | Facility: HOSPITAL | Age: 40
LOS: 1 days | End: 2020-06-03
Payer: SELF-PAY

## 2020-06-03 ENCOUNTER — APPOINTMENT (OUTPATIENT)
Dept: FAMILY MEDICINE | Facility: HOSPITAL | Age: 40
End: 2020-06-03

## 2020-06-03 VITALS
HEIGHT: 62 IN | SYSTOLIC BLOOD PRESSURE: 136 MMHG | TEMPERATURE: 98.9 F | BODY MASS INDEX: 26.68 KG/M2 | HEART RATE: 78 BPM | WEIGHT: 145 LBS | RESPIRATION RATE: 16 BRPM | OXYGEN SATURATION: 98 % | DIASTOLIC BLOOD PRESSURE: 84 MMHG

## 2020-06-03 DIAGNOSIS — Z00.00 ENCOUNTER FOR GENERAL ADULT MEDICAL EXAMINATION W/OUT ABNORMAL FINDINGS: ICD-10-CM

## 2020-06-03 DIAGNOSIS — Z00.00 ENCOUNTER FOR GENERAL ADULT MEDICAL EXAMINATION WITHOUT ABNORMAL FINDINGS: ICD-10-CM

## 2020-06-03 PROCEDURE — 84443 ASSAY THYROID STIM HORMONE: CPT

## 2020-06-03 PROCEDURE — 36415 COLL VENOUS BLD VENIPUNCTURE: CPT

## 2020-06-03 PROCEDURE — G0463: CPT

## 2020-06-04 DIAGNOSIS — R00.2 PALPITATIONS: ICD-10-CM

## 2020-06-04 NOTE — ASSESSMENT
Dx: Lymphedema (I89.0)           Authorized # of Visits:  2/24        Next MD visit/plan renewal:  2/27/20  Fall Risk: HIGH          Precautions:  Left UE limb precautions; HIGH FALL RISK; cardiac; lymphedema; wound/infection risk         Subjective:   *Pt [FreeTextEntry1] : 38 y/o F with PMHx of lung nodule, HTN came to the clinic for follow up in medical conditions.\par \par # Zapine/palpitations\par - Last episode one month ago \par - Had a Holter monitor in January,20 that showed occasional PVCs \par - Continue Metoprolol 50 mg PO daily \par - Make a  f/u appt with cardiology. Cardiology referral given  \par - Check TSH \par \par #HTN\par - /80\par - Continue Metoprolol 50mg PO daily \par - Advised to take BP at home and bring results in the next appt \par \par #Lung nodule \par - Incidental lung nodule found in a CT \par - Pulmonary referral re printed \par \par #Health maintenance\par - No evidence of recent pap smear\par - Schedule an appt once pandemic is over \par \par RTC in 2-3 months \par \par Case discussed with Dr. King\par \par \par  used during reduction phase. Pt leaning towards considering purchasing a traditional Velcro-closure garment that could be used for reduction and for final garment. Discussed potential costs involved. Pt wanting to discuss further with .    *Bilateral response to [employer] \"booties. \"  Awaiting pt's decision on supplies to be used during reduction phase of treatment.      Charges: 4 Manual Therapy    Total Timed Treatment: 55 min  Total Treatment Time: 60 min

## 2020-06-04 NOTE — HISTORY OF PRESENT ILLNESS
[FreeTextEntry1] : Palpitations follow up  [de-identified] : 38 y/o F with PMHx of lung nodule, HTN came to the clinic for follow up in medical conditions. She repots one year ago started to have intermittent Zapien, palpitations ,SOB and chest discomfort. She saw a cardiology on January, 20 and had a MELBA that was normal and a  Holter monitor remarkable for occasional PVCs. She was started on Metoprolol 50 mg PO daily with some improvement. She reports last episode of SOB /palpitations was one month ago. She states don't take the  BP at home but is complaint with medications. She reports occasional headache and dizziness, being the last one this morning. Denies chest pain, leg swelling, diaphoresis and any other symptoms.

## 2020-06-04 NOTE — REVIEW OF SYSTEMS
[Dyspnea on Exertion] : dyspnea on exertion [Negative] : Psychiatric [Palpitations] : palpitations [Shortness Of Breath] : no shortness of breath [Wheezing] : no wheezing [Cough] : no cough

## 2020-06-11 ENCOUNTER — NON-APPOINTMENT (OUTPATIENT)
Age: 40
End: 2020-06-11

## 2020-06-16 ENCOUNTER — NON-APPOINTMENT (OUTPATIENT)
Age: 40
End: 2020-06-16

## 2020-06-16 ENCOUNTER — OUTPATIENT (OUTPATIENT)
Dept: OUTPATIENT SERVICES | Facility: HOSPITAL | Age: 40
LOS: 1 days | End: 2020-06-16
Payer: SELF-PAY

## 2020-06-16 ENCOUNTER — APPOINTMENT (OUTPATIENT)
Dept: CARDIOLOGY | Facility: HOSPITAL | Age: 40
End: 2020-06-16

## 2020-06-16 VITALS — SYSTOLIC BLOOD PRESSURE: 162 MMHG | HEART RATE: 69 BPM | OXYGEN SATURATION: 100 % | DIASTOLIC BLOOD PRESSURE: 95 MMHG

## 2020-06-16 DIAGNOSIS — I25.10 ATHEROSCLEROTIC HEART DISEASE OF NATIVE CORONARY ARTERY WITHOUT ANGINA PECTORIS: ICD-10-CM

## 2020-06-16 PROCEDURE — G0463: CPT

## 2020-06-16 PROCEDURE — 93005 ELECTROCARDIOGRAM TRACING: CPT

## 2020-06-16 NOTE — PHYSICAL EXAM
[General Appearance - Well Developed] : well developed [Well Groomed] : well groomed [General Appearance - Well Nourished] : well nourished [Normal Appearance] : normal appearance [No Deformities] : no deformities [Normal Conjunctiva] : the conjunctiva exhibited no abnormalities [General Appearance - In No Acute Distress] : no acute distress [Eyelids - No Xanthelasma] : the eyelids demonstrated no xanthelasmas [No Oral Cyanosis] : no oral cyanosis [No Oral Pallor] : no oral pallor [Normal Oral Mucosa] : normal oral mucosa [Normal Jugular Venous A Waves Present] : normal jugular venous A waves present [No Jugular Venous Roach A Waves] : no jugular venous roach A waves [Normal Jugular Venous V Waves Present] : normal jugular venous V waves present [Murmurs] : no murmurs present [Heart Sounds] : normal S1 and S2 [Heart Rate And Rhythm] : heart rate and rhythm were normal [Respiration, Rhythm And Depth] : normal respiratory rhythm and effort [Auscultation Breath Sounds / Voice Sounds] : lungs were clear to auscultation bilaterally [Exaggerated Use Of Accessory Muscles For Inspiration] : no accessory muscle use [Abdomen Mass (___ Cm)] : no abdominal mass palpated [Abdomen Soft] : soft [Abdomen Tenderness] : non-tender [Gait - Sufficient For Exercise Testing] : the gait was sufficient for exercise testing [Nail Clubbing] : no clubbing of the fingernails [Abnormal Walk] : normal gait [Cyanosis, Localized] : no localized cyanosis [Petechial Hemorrhages (___cm)] : no petechial hemorrhages [Skin Color & Pigmentation] : normal skin color and pigmentation [No Venous Stasis] : no venous stasis [Skin Lesions] : no skin lesions [] : no rash [No Xanthoma] : no  xanthoma was observed [No Skin Ulcers] : no skin ulcer

## 2020-06-16 NOTE — REVIEW OF SYSTEMS
[see HPI] : see HPI [Palpitations] : palpitations [Dyspnea on exertion] : dyspnea during exertion [Negative] : Endocrine

## 2020-06-17 ENCOUNTER — APPOINTMENT (OUTPATIENT)
Dept: PULMONOLOGY | Facility: CLINIC | Age: 40
End: 2020-06-17
Payer: COMMERCIAL

## 2020-06-17 PROCEDURE — 99203 OFFICE O/P NEW LOW 30 MIN: CPT | Mod: 95

## 2020-06-17 NOTE — DISCUSSION/SUMMARY
[FreeTextEntry1] : 40 year old female with PMH HTN presents with abnormal CT scan.\par \par Pulmonary nodule/dyspnea\par -most likely old scarring from previous infection\par -dyspnea is most likely related to uncontrolled HTN\par -encouraged pt to continue taking BP medications and to keep blood pressure below 140/90\par -will look into lung function tests closer to her home as she has no real means of transportation

## 2020-06-17 NOTE — HISTORY OF PRESENT ILLNESS
[TextBox_4] : 40 year old female with history of HTN presents with pulmonary nodule.  She had a chest xray of the chest back in January for chest pain which revealed a 5mm nodule; she then had a follow up CT which confirmed a small nodule to RUL.  Pt continues to have dyspnea on exertion.  She was diagnosed with hypertension in [place], [place].  She was taking enalapril but stopped taking it when she was told her blood pressure was under control.  Pt is currently back on blood pressure medication.  She also had an echo which showed an mild eccentric left ventricular hypertrophy and grade 1 diastolic dysfunction.\par Her 2 aunts had a history of TB.\par She works as a cook a kitchen since moving to the .

## 2020-06-17 NOTE — REASON FOR VISIT
[Medical Office: ([facility])___] : at the medical office located in  [Home] : at home, [unfilled] , at the time of the visit. [] :  [Verbal consent obtained from patient] : the patient, [unfilled] [Initial] : an initial visit [Abnormal CXR/ Chest CT] : an abnormal CXR/ chest CT

## 2020-06-18 DIAGNOSIS — I10 ESSENTIAL (PRIMARY) HYPERTENSION: ICD-10-CM

## 2020-06-18 DIAGNOSIS — R06.00 DYSPNEA, UNSPECIFIED: ICD-10-CM

## 2020-06-18 DIAGNOSIS — R00.2 PALPITATIONS: ICD-10-CM

## 2020-06-18 NOTE — HISTORY OF PRESENT ILLNESS
[FreeTextEntry1] : Ms Luciano is a 38 yo F with HTN who presents for evaluation of SOB and CAPONE. She notes that since March she had been experiencing intermittent CAPONE with walking up flights of stairs or after prolonged periods of walking. Her CAPONE is associated with palpitations. Over the past 3 weeks, she has not experienced symptoms and has felt well.\par \par Of note, in Dec 2019 she presented to [facility] with chest pain, palpitations. She underwent TTE which had grade I diastolic dysfunction and eccentric LVH. Also had Holter monitor with ~14% PVCs. She was started on metoprolol and felt well.\par \par Holter 1/8/20\par Impression\par 1. Normal sinus rhythm\par 2. Frequent PVCs occurring at a frequency of 13.7% of total heart beats\par 3. No symptoms are reported \par 4. The results were discussed with Dr. Brown over the telephone \par \par TTE 1/7/20\par Summary:\par  1. Left ventricular ejection fraction, by visual estimation, is 60%.\par  2. Normal global left ventricular systolic function.\par  3. Spectral Doppler shows impaired relaxation pattern of left ventricular myocardial filling (Grade I diastolic dysfunction).\par  4. There is mild eccentric left ventricular hypertrophy.\par  5. LA volume Index is 30.8 ml/m² ml/m2.\par \par PMH:\par HTN\par \par PSH: \par C section\par \par Meds:\par metoprolol tartrate 50mg PO BID\par \par Allergies:\par None\par \par SH:\par EtOH: denies\par Smoking: denies\par Illicits: denies

## 2020-07-01 LAB — TSH SERPL-ACNC: 0.9 UIU/ML

## 2020-07-21 ENCOUNTER — APPOINTMENT (OUTPATIENT)
Dept: CARDIOLOGY | Facility: HOSPITAL | Age: 40
End: 2020-07-21

## 2020-07-21 ENCOUNTER — OUTPATIENT (OUTPATIENT)
Dept: OUTPATIENT SERVICES | Facility: HOSPITAL | Age: 40
LOS: 1 days | End: 2020-07-21
Payer: SELF-PAY

## 2020-07-21 VITALS
HEART RATE: 81 BPM | WEIGHT: 155 LBS | BODY MASS INDEX: 28.52 KG/M2 | DIASTOLIC BLOOD PRESSURE: 91 MMHG | OXYGEN SATURATION: 100 % | HEIGHT: 62 IN | SYSTOLIC BLOOD PRESSURE: 156 MMHG

## 2020-07-21 DIAGNOSIS — I25.10 ATHEROSCLEROTIC HEART DISEASE OF NATIVE CORONARY ARTERY WITHOUT ANGINA PECTORIS: ICD-10-CM

## 2020-07-21 PROCEDURE — G0463: CPT

## 2020-07-21 RX ORDER — AMLODIPINE BESYLATE 5 MG/1
5 TABLET ORAL DAILY
Qty: 30 | Refills: 2 | Status: DISCONTINUED | COMMUNITY
Start: 2020-06-16 | End: 2020-07-21

## 2020-07-21 NOTE — PHYSICAL EXAM
[Normal Appearance] : normal appearance [General Appearance - Well Developed] : well developed [Well Groomed] : well groomed [No Deformities] : no deformities [General Appearance - Well Nourished] : well nourished [Normal Conjunctiva] : the conjunctiva exhibited no abnormalities [General Appearance - In No Acute Distress] : no acute distress [Eyelids - No Xanthelasma] : the eyelids demonstrated no xanthelasmas [Normal Oral Mucosa] : normal oral mucosa [No Oral Pallor] : no oral pallor [No Oral Cyanosis] : no oral cyanosis [Normal Jugular Venous V Waves Present] : normal jugular venous V waves present [No Jugular Venous Roach A Waves] : no jugular venous roach A waves [Normal Jugular Venous A Waves Present] : normal jugular venous A waves present [Exaggerated Use Of Accessory Muscles For Inspiration] : no accessory muscle use [Respiration, Rhythm And Depth] : normal respiratory rhythm and effort [Heart Sounds] : normal S1 and S2 [Auscultation Breath Sounds / Voice Sounds] : lungs were clear to auscultation bilaterally [Murmurs] : no murmurs present [Heart Rate And Rhythm] : heart rate and rhythm were normal [Abdomen Soft] : soft [Abdomen Tenderness] : non-tender [Abnormal Walk] : normal gait [Abdomen Mass (___ Cm)] : no abdominal mass palpated [Gait - Sufficient For Exercise Testing] : the gait was sufficient for exercise testing [Petechial Hemorrhages (___cm)] : no petechial hemorrhages [Cyanosis, Localized] : no localized cyanosis [Nail Clubbing] : no clubbing of the fingernails [Skin Color & Pigmentation] : normal skin color and pigmentation [No Venous Stasis] : no venous stasis [Skin Lesions] : no skin lesions [] : no rash [No Skin Ulcers] : no skin ulcer [No Xanthoma] : no  xanthoma was observed

## 2020-07-21 NOTE — REVIEW OF SYSTEMS
[Palpitations] : palpitations [Dyspnea on exertion] : dyspnea during exertion [see HPI] : see HPI [Negative] : Endocrine

## 2020-07-22 DIAGNOSIS — I10 ESSENTIAL (PRIMARY) HYPERTENSION: ICD-10-CM

## 2020-07-22 DIAGNOSIS — R00.2 PALPITATIONS: ICD-10-CM

## 2020-07-27 NOTE — HISTORY OF PRESENT ILLNESS
[FreeTextEntry1] : Ms Luciano is a 41 yo F with HTN who presents for follow up of SOB and CAPONE. Patient was last seen in June and was started on amlodipine for HTN. Since then, she notes worsening ankle edema and uncontrolled BP despite compliance. She continues to feel intermittent SOB/CAPONE with 5 episodes over the past month.\par \par From prior notes:\par "She notes that since March she had been experiencing intermittent CAPONE with walking up flights of stairs or after prolonged periods of walking. Her ACPONE is associated with palpitations. Over the past 3 weeks, she has not experienced symptoms and has felt well. EKG at last visit was normal.\par \par Of note, in Dec 2019 she presented to [facility] with chest pain, palpitations. She underwent TTE which had grade I diastolic dysfunction and eccentric LVH. Also had Holter monitor with ~14% PVCs. She was started on metoprolol and felt well."\par \par Holter 1/8/20\par Impression\par 1. Normal sinus rhythm\par 2. Frequent PVCs occurring at a frequency of 13.7% of total heart beats\par 3. No symptoms are reported \par 4. The results were discussed with Dr. Brown over the telephone \par \par TTE 1/7/20\par Summary:\par  1. Left ventricular ejection fraction, by visual estimation, is 60%.\par  2. Normal global left ventricular systolic function.\par  3. Spectral Doppler shows impaired relaxation pattern of left ventricular myocardial filling (Grade I diastolic dysfunction).\par  4. There is mild eccentric left ventricular hypertrophy.\par  5. LA volume Index is 30.8 ml/m² ml/m2.\par \par PMH:\par HTN\par \par PSH: \par C section\par \par Meds:\par metoprolol tartrate 50mg PO BID\par \par Allergies:\par None\par \par SH:\par EtOH: denies\par Smoking: denies\par Illicits: denies

## 2020-08-03 ENCOUNTER — MED ADMIN CHARGE (OUTPATIENT)
Age: 40
End: 2020-08-03

## 2020-08-03 ENCOUNTER — APPOINTMENT (OUTPATIENT)
Dept: FAMILY MEDICINE | Facility: HOSPITAL | Age: 40
End: 2020-08-03

## 2020-08-03 ENCOUNTER — OUTPATIENT (OUTPATIENT)
Dept: OUTPATIENT SERVICES | Facility: HOSPITAL | Age: 40
LOS: 1 days | End: 2020-08-03
Payer: SELF-PAY

## 2020-08-03 VITALS
BODY MASS INDEX: 28.9 KG/M2 | RESPIRATION RATE: 17 BRPM | HEART RATE: 81 BPM | TEMPERATURE: 98 F | OXYGEN SATURATION: 98 % | SYSTOLIC BLOOD PRESSURE: 132 MMHG | DIASTOLIC BLOOD PRESSURE: 78 MMHG | WEIGHT: 158 LBS

## 2020-08-03 DIAGNOSIS — Z09 ENCOUNTER FOR FOLLOW-UP EXAMINATION AFTER COMPLETED TREATMENT FOR CONDITIONS OTHER THAN MALIGNANT NEOPLASM: ICD-10-CM

## 2020-08-03 DIAGNOSIS — Z00.00 ENCOUNTER FOR GENERAL ADULT MEDICAL EXAMINATION WITHOUT ABNORMAL FINDINGS: ICD-10-CM

## 2020-08-03 DIAGNOSIS — Z87.898 PERSONAL HISTORY OF OTHER SPECIFIED CONDITIONS: ICD-10-CM

## 2020-08-03 DIAGNOSIS — Z29.9 ENCOUNTER FOR PROPHYLACTIC MEASURES, UNSPECIFIED: ICD-10-CM

## 2020-08-03 PROCEDURE — G0463: CPT

## 2020-08-03 NOTE — PHYSICAL EXAM
[Normal] : no acute distress, well nourished, well developed and well-appearing [No Extremity Clubbing/Cyanosis] : no extremity clubbing/cyanosis [No Edema] : there was no peripheral edema

## 2020-08-04 DIAGNOSIS — I10 ESSENTIAL (PRIMARY) HYPERTENSION: ICD-10-CM

## 2020-08-04 NOTE — REVIEW OF SYSTEMS
EKG - Sinus tach @ 131 bpm   Echo - 9/17 - Normal LV function    DOne today pending read       a/p     1) Sinus tach - start on lopressor 25mg PO, likey sec to fever and infection, heart rate improved s/p IV lopressor     2) Neutropenic fevers - ecoli bacteremia and Fungemia , t/t as per ID    3) H/o pulmonary embolism - resume xarelto [Dyspnea on Exertion] : dyspnea on exertion [Chest Pain] : chest pain [Shortness Of Breath] : shortness of breath [Fever] : no fever [Headache] : no headache [Chills] : no chills [Lower Ext Edema] : no lower extremity edema [Dizziness] : no dizziness

## 2020-08-04 NOTE — ASSESSMENT
[FreeTextEntry1] : 40F with HTN presents for f/u HTN\par -CAPONE unchanged. Blood pressure well controlled on new medication regimen\par -Continue metoprolol 50mg BID and lisinopril 10mg daily\par -F/u with cardiology and pulmonology for CAPONE; agree with PFTs\par -F/u HTN 3 months\par -RTC in September for flu vaccine

## 2020-08-04 NOTE — HISTORY OF PRESENT ILLNESS
[FreeTextEntry1] : HTN f/u [de-identified] : 40F with HTN presents for f/u HTN\par -Patient being followed by cardiology and pulmonology for dyspnea on exertion associated with pleuritic chest pain. Patient states today that this is unchanged. ECHO 1/7/20 demonstrated grade 1 DD and mild eccentric LV hypertrophy. Holter 1/8/20 was significant for frequent PVCs (13.7%). CXR significant for small RUL nodule thought by pulm to be scarring from prior infection. Planned for pulmonary function testing\par -Week prior amlodipine was discontinued and lisinopril started due to LE edema. Patient states LE edema has resolved\par -Patient denies chest pain and dyspnea at present. Denies LE edema. Denies headache and dizziness

## 2020-09-01 ENCOUNTER — APPOINTMENT (OUTPATIENT)
Dept: CARDIOLOGY | Facility: HOSPITAL | Age: 40
End: 2020-09-01

## 2020-09-01 ENCOUNTER — OUTPATIENT (OUTPATIENT)
Dept: OUTPATIENT SERVICES | Facility: HOSPITAL | Age: 40
LOS: 1 days | End: 2020-09-01
Payer: SELF-PAY

## 2020-09-01 VITALS — SYSTOLIC BLOOD PRESSURE: 140 MMHG | DIASTOLIC BLOOD PRESSURE: 90 MMHG

## 2020-09-01 PROCEDURE — G0463: CPT

## 2020-09-01 PROCEDURE — 93005 ELECTROCARDIOGRAM TRACING: CPT

## 2020-09-01 NOTE — PHYSICAL EXAM
[General Appearance - Well Developed] : well developed [Normal Appearance] : normal appearance [Well Groomed] : well groomed [General Appearance - Well Nourished] : well nourished [No Deformities] : no deformities [General Appearance - In No Acute Distress] : no acute distress [Normal Conjunctiva] : the conjunctiva exhibited no abnormalities [Eyelids - No Xanthelasma] : the eyelids demonstrated no xanthelasmas [Normal Oral Mucosa] : normal oral mucosa [No Oral Pallor] : no oral pallor [No Oral Cyanosis] : no oral cyanosis [Normal Jugular Venous A Waves Present] : normal jugular venous A waves present [Normal Jugular Venous V Waves Present] : normal jugular venous V waves present [No Jugular Venous Roach A Waves] : no jugular venous roach A waves [Respiration, Rhythm And Depth] : normal respiratory rhythm and effort [Exaggerated Use Of Accessory Muscles For Inspiration] : no accessory muscle use [Auscultation Breath Sounds / Voice Sounds] : lungs were clear to auscultation bilaterally [Heart Rate And Rhythm] : heart rate and rhythm were normal [Heart Sounds] : normal S1 and S2 [Murmurs] : no murmurs present [Edema] : no peripheral edema present [Abdomen Soft] : soft [Abdomen Tenderness] : non-tender [Abdomen Mass (___ Cm)] : no abdominal mass palpated [Abnormal Walk] : normal gait [Gait - Sufficient For Exercise Testing] : the gait was sufficient for exercise testing [Nail Clubbing] : no clubbing of the fingernails [Cyanosis, Localized] : no localized cyanosis [Petechial Hemorrhages (___cm)] : no petechial hemorrhages [Skin Color & Pigmentation] : normal skin color and pigmentation [] : no rash [No Venous Stasis] : no venous stasis [Skin Lesions] : no skin lesions [No Skin Ulcers] : no skin ulcer [No Xanthoma] : no  xanthoma was observed [Oriented To Time, Place, And Person] : oriented to person, place, and time [Affect] : the affect was normal [Mood] : the mood was normal [No Anxiety] : not feeling anxious

## 2020-09-01 NOTE — HISTORY OF PRESENT ILLNESS
[FreeTextEntry1] : Ms Luciano is a 39 yo F with HTN who presents for follow up of HTN. She was last seen in July and BP was uncontrolled. Her amlodipine was stopped 2/2 LE edema and she was started on lisinopril 10mg in addition to her metop tartrate 50mg BID. Since our last visit, she has felt well. Her SOB/CAPONE has essentially resolved. She notes frequent eructation and abdominal fullness after eating for which she hopes to see GI. Otherwise, no complaints.\par \par \par From prior notes:\par "She notes that since March she had been experiencing intermittent CAPONE with walking up flights of stairs or after prolonged periods of walking. Her CAPONE is associated with palpitations. Over the past 3 weeks, she has not experienced symptoms and has felt well. EKG at last visit was normal.\par \par Of note, in Dec 2019 she presented to [facility] with chest pain, palpitations. She underwent TTE which had grade I diastolic dysfunction and eccentric LVH. Also had Holter monitor with ~14% PVCs. She was started on metoprolol and felt well."\par \par Holter 1/8/20\par Impression\par 1. Normal sinus rhythm\par 2. Frequent PVCs occurring at a frequency of 13.7% of total heart beats\par 3. No symptoms are reported \par 4. The results were discussed with Dr. Brown over the telephone \par \par TTE 1/7/20\par Summary:\par  1. Left ventricular ejection fraction, by visual estimation, is 60%.\par  2. Normal global left ventricular systolic function.\par  3. Spectral Doppler shows impaired relaxation pattern of left ventricular myocardial filling (Grade I diastolic dysfunction).\par  4. There is mild eccentric left ventricular hypertrophy.\par  5. LA volume Index is 30.8 ml/m² ml/m2.\par \par PMH:\par HTN\par \par PSH: \par C section\par \par Meds:\par metoprolol tartrate 50mg PO BID\par \par Allergies:\par None\par \par SH:\par EtOH: denies\par Smoking: denies\par Illicits: denies

## 2020-11-23 ENCOUNTER — NON-APPOINTMENT (OUTPATIENT)
Age: 40
End: 2020-11-23

## 2020-12-01 ENCOUNTER — APPOINTMENT (OUTPATIENT)
Dept: CARDIOLOGY | Facility: HOSPITAL | Age: 40
End: 2020-12-01

## 2020-12-01 ENCOUNTER — OUTPATIENT (OUTPATIENT)
Dept: OUTPATIENT SERVICES | Facility: HOSPITAL | Age: 40
LOS: 1 days | End: 2020-12-01
Payer: SELF-PAY

## 2020-12-01 VITALS — DIASTOLIC BLOOD PRESSURE: 78 MMHG | OXYGEN SATURATION: 96 % | HEART RATE: 68 BPM | SYSTOLIC BLOOD PRESSURE: 135 MMHG

## 2020-12-01 DIAGNOSIS — I25.10 ATHEROSCLEROTIC HEART DISEASE OF NATIVE CORONARY ARTERY WITHOUT ANGINA PECTORIS: ICD-10-CM

## 2020-12-01 PROCEDURE — 93005 ELECTROCARDIOGRAM TRACING: CPT

## 2020-12-01 PROCEDURE — G0463: CPT

## 2020-12-01 NOTE — HISTORY OF PRESENT ILLNESS
[FreeTextEntry1] : Ms Luciano is a 39 yo F with HTN who presents for follow up of HTN. She was last seen in September for follow up and BP was well controlled at the time. Since then, she has been compliant with her medications however she has again developed dyspnea on exertion. She notes CAPONE with climbing one flight of stairs and walking a couple of blocks. She also notes fatigue. These symptoms can also occur with some household chores. She denies chest pain, palpitations, dizziness, LE edema, orthopnea. \par \par From prior notes:\par "She notes that since March she had been experiencing intermittent CAPONE with walking up flights of stairs or after prolonged periods of walking. Her CAPONE is associated with palpitations. Over the past 3 weeks, she has not experienced symptoms and has felt well. EKG at last visit was normal.\par \par Of note, in Dec 2019 she presented to [facility] with chest pain, palpitations. She underwent TTE which had grade I diastolic dysfunction and eccentric LVH. Also had Holter monitor with ~14% PVCs. She was started on metoprolol and felt well."\par \par Holter 1/8/20\par Impression\par 1. Normal sinus rhythm\par 2. Frequent PVCs occurring at a frequency of 13.7% of total heart beats\par 3. No symptoms are reported \par 4. The results were discussed with Dr. Brown over the telephone \par \par TTE 1/7/20\par Summary:\par  1. Left ventricular ejection fraction, by visual estimation, is 60%.\par  2. Normal global left ventricular systolic function.\par  3. Spectral Doppler shows impaired relaxation pattern of left ventricular myocardial filling (Grade I diastolic dysfunction).\par  4. There is mild eccentric left ventricular hypertrophy.\par  5. LA volume Index is 30.8 ml/m² ml/m2.\par \par PMH:\par HTN\par \par PSH: \par C section\par \par Meds:\par metoprolol tartrate 50mg PO BID\par lisinopril 10mg daily\par \par Allergies:\par None\par \par SH:\par EtOH: denies\par Smoking: denies\par Illicits: denies

## 2020-12-03 DIAGNOSIS — I10 ESSENTIAL (PRIMARY) HYPERTENSION: ICD-10-CM

## 2020-12-03 DIAGNOSIS — R06.00 DYSPNEA, UNSPECIFIED: ICD-10-CM

## 2020-12-22 ENCOUNTER — APPOINTMENT (OUTPATIENT)
Dept: CV DIAGNOSTICS | Facility: HOSPITAL | Age: 40
End: 2020-12-22

## 2020-12-22 ENCOUNTER — OUTPATIENT (OUTPATIENT)
Dept: OUTPATIENT SERVICES | Facility: HOSPITAL | Age: 40
LOS: 1 days | End: 2020-12-22
Payer: SELF-PAY

## 2020-12-22 DIAGNOSIS — R06.00 DYSPNEA, UNSPECIFIED: ICD-10-CM

## 2020-12-22 PROCEDURE — 93017 CV STRESS TEST TRACING ONLY: CPT

## 2020-12-22 PROCEDURE — 93018 CV STRESS TEST I&R ONLY: CPT

## 2020-12-22 PROCEDURE — 93016 CV STRESS TEST SUPVJ ONLY: CPT

## 2021-01-22 ENCOUNTER — APPOINTMENT (OUTPATIENT)
Dept: FAMILY MEDICINE | Facility: HOSPITAL | Age: 41
End: 2021-01-22

## 2021-01-22 ENCOUNTER — OUTPATIENT (OUTPATIENT)
Dept: OUTPATIENT SERVICES | Facility: HOSPITAL | Age: 41
LOS: 1 days | End: 2021-01-22
Payer: SELF-PAY

## 2021-01-22 VITALS
SYSTOLIC BLOOD PRESSURE: 142 MMHG | OXYGEN SATURATION: 96 % | HEART RATE: 87 BPM | TEMPERATURE: 97.5 F | HEIGHT: 62 IN | DIASTOLIC BLOOD PRESSURE: 89 MMHG | RESPIRATION RATE: 16 BRPM

## 2021-01-22 DIAGNOSIS — Z00.00 ENCOUNTER FOR GENERAL ADULT MEDICAL EXAMINATION WITHOUT ABNORMAL FINDINGS: ICD-10-CM

## 2021-01-22 PROCEDURE — G0463: CPT

## 2021-01-22 NOTE — REVIEW OF SYSTEMS
[Vision Problems] : vision problems [Dyspnea on Exertion] : dyspnea on exertion [Fever] : no fever [Chills] : no chills [Fatigue] : no fatigue [Night Sweats] : no night sweats [Recent Change In Weight] : ~T no recent weight change [Discharge] : no discharge [Pain] : no pain [Redness] : no redness [Itching] : no itching [Earache] : no earache [Hearing Loss] : no hearing loss [Nasal Discharge] : no nasal discharge [Sore Throat] : no sore throat [Chest Pain] : no chest pain [Palpitations] : no palpitations [Leg Claudication] : no leg claudication [Orthopnea] : no orthopnea [Paroxysmal Nocturnal Dyspnea] : no paroxysmal nocturnal dyspnea [Shortness Of Breath] : no shortness of breath [Wheezing] : no wheezing [Cough] : no cough [Abdominal Pain] : no abdominal pain [Nausea] : no nausea [Constipation] : no constipation [Vomiting] : no vomiting [Heartburn] : no heartburn [Melena] : no melena [Incontinence] : no incontinence [Dysuria] : no dysuria [Nocturia] : no nocturia [Hematuria] : no hematuria [Frequency] : no frequency [Joint Stiffness] : no joint stiffness [Muscle Pain] : no muscle pain [Skin Rash] : no skin rash [Headache] : no headache [Dizziness] : no dizziness [Memory Loss] : no memory loss

## 2021-01-22 NOTE — PHYSICAL EXAM
[No Acute Distress] : no acute distress [Well Nourished] : well nourished [Well Developed] : well developed [Well-Appearing] : well-appearing [Normal Sclera/Conjunctiva] : normal sclera/conjunctiva [PERRL] : pupils equal round and reactive to light [EOMI] : extraocular movements intact [Normal Outer Ear/Nose] : the outer ears and nose were normal in appearance [Normal Oropharynx] : the oropharynx was normal [No JVD] : no jugular venous distention [No Lymphadenopathy] : no lymphadenopathy [Supple] : supple [No Respiratory Distress] : no respiratory distress  [No Accessory Muscle Use] : no accessory muscle use [Clear to Auscultation] : lungs were clear to auscultation bilaterally [Normal Rate] : normal rate  [Regular Rhythm] : with a regular rhythm [Normal S1, S2] : normal S1 and S2 [No Murmur] : no murmur heard [Soft] : abdomen soft [Non Tender] : non-tender [Non-distended] : non-distended [No HSM] : no HSM [Normal Bowel Sounds] : normal bowel sounds [No Joint Swelling] : no joint swelling [Grossly Normal Strength/Tone] : grossly normal strength/tone [Fundoscopic Exam Performed] : fundoscopic ~T exam ~C was performed

## 2021-01-22 NOTE — HISTORY OF PRESENT ILLNESS
[[employer] ] : provided by [employer]   [FreeTextEntry1] : [id number] [TWNoteComboBox1] : [place] [de-identified] : \par 41 y/o F presents for f/u HTN. Last seen by cardiology 1 mo ago for f/u HTN, BP well controlled at the time. Pt takes lisinopril 10 mg and metoprolol tartate 50 mg BID, compliant with medications. PT also being followed by cards and pulmonology for dyspnea and exertion she has had for over a year. States sxs unchanged since her last appt. TTE in 1/7/20 showed grade 1 DD + mild eccentric LVH. Holter 1/8 significant for frequent PVCs (13.7%). CXR in Jan 2020 also showed 5 mm nodule, f/u CT small nodule RUL, followed by pulm who states likely 2/2 scarring from previous infection. Last visit with pulm in June 2020, pt states has not had appt due to pandemic. Request pulm referral today. Pt had exercise stress test 1 month ago for eval of CAPONE by cardiologist. Pt states called her cardiologist office who stated will call pt with results of stress test and schedule f/u appointment. \par Pt's BP elevated today, measured twice today and repeat measurement 142/89. Pt does c/o of blurry vision for a few months, request optho referral. No hx of wearing glasses or contacts. Denies chest pain, SOB, palpitations, headache, dizziness.

## 2021-01-22 NOTE — PLAN
[FreeTextEntry1] : 41 y/o F presents for HTN f/u \par \par #HTN\par -Continue metoprolol 50mg BID \par -Will increase lisinopril from 10 mg to 20 mg as BP elevated\par -DASH diet\par -f/u 1 month for BP check\par \par #Blurry vision\par -pt given opthalmology referral\par \par #Dyspnea on Exertion\par -unchanged from prior visit, following with cards and pulm\par -pt request print pulmonology referral to make f/u appt\par -continue metoprolol 50 mg BID\par -stress test done in December, pt states called cardiology office who will call her with results and for f/u appt\par \par RTC for f/u HTN in 1 month \par \par Case discussed with Dr. Ramirez

## 2021-01-25 DIAGNOSIS — I10 ESSENTIAL (PRIMARY) HYPERTENSION: ICD-10-CM

## 2021-01-25 DIAGNOSIS — H53.8 OTHER VISUAL DISTURBANCES: ICD-10-CM

## 2021-01-25 DIAGNOSIS — R06.00 DYSPNEA, UNSPECIFIED: ICD-10-CM

## 2021-02-14 ENCOUNTER — RX RENEWAL (OUTPATIENT)
Age: 41
End: 2021-02-14

## 2021-02-15 ENCOUNTER — NON-APPOINTMENT (OUTPATIENT)
Age: 41
End: 2021-02-15

## 2021-03-17 ENCOUNTER — NON-APPOINTMENT (OUTPATIENT)
Age: 41
End: 2021-03-17

## 2021-03-30 ENCOUNTER — APPOINTMENT (OUTPATIENT)
Dept: FAMILY MEDICINE | Facility: HOSPITAL | Age: 41
End: 2021-03-30

## 2021-03-30 ENCOUNTER — OUTPATIENT (OUTPATIENT)
Dept: OUTPATIENT SERVICES | Facility: HOSPITAL | Age: 41
LOS: 1 days | End: 2021-03-30
Payer: SELF-PAY

## 2021-03-30 VITALS
DIASTOLIC BLOOD PRESSURE: 82 MMHG | TEMPERATURE: 98.2 F | SYSTOLIC BLOOD PRESSURE: 126 MMHG | OXYGEN SATURATION: 98 % | BODY MASS INDEX: 29.81 KG/M2 | HEART RATE: 83 BPM | RESPIRATION RATE: 16 BRPM | WEIGHT: 163 LBS

## 2021-03-30 DIAGNOSIS — Z00.00 ENCOUNTER FOR GENERAL ADULT MEDICAL EXAMINATION WITHOUT ABNORMAL FINDINGS: ICD-10-CM

## 2021-03-30 PROCEDURE — G0463: CPT

## 2021-03-30 NOTE — PHYSICAL EXAM
[No Acute Distress] : no acute distress [Well Nourished] : well nourished [Well Developed] : well developed [Well-Appearing] : well-appearing [Normal Sclera/Conjunctiva] : normal sclera/conjunctiva [PERRL] : pupils equal round and reactive to light [EOMI] : extraocular movements intact [No Respiratory Distress] : no respiratory distress  [No Accessory Muscle Use] : no accessory muscle use [Clear to Auscultation] : lungs were clear to auscultation bilaterally [Normal Rate] : normal rate  [Regular Rhythm] : with a regular rhythm [Normal S1, S2] : normal S1 and S2 [No Murmur] : no murmur heard [No Edema] : there was no peripheral edema

## 2021-03-31 DIAGNOSIS — I10 ESSENTIAL (PRIMARY) HYPERTENSION: ICD-10-CM

## 2021-04-07 NOTE — PLAN
[FreeTextEntry1] : 39 y/o F presents for f/u HTN\par \par #HTN\par -BP well controlled today, 126/82\par -Continue metoprolol 50mg BID, continue lisinopril 20 mg \par -DASH diet\par \par #Blurry vision\par -reprinted opthalmology referral\par \par #Dyspnea on Exertion\par -unchanged from prior visit, f/u with cards and pulm\par -pt request another copy of  pulmonology referral to make f/u appt\par -continue metoprolol 50 mg BID\par -stress test done in December with cardiology, pt states waiting to receive results of stress test, does not have number of cardiologist office. Will speak to  to find out number so pt can call cardiologist to f/u results\par \par #HCM\par -no history of pap, states had many years ago but unsure of results\par -rtc for pap \par \par RTC for pap and CPE\par \par Case discussed with Dr. Ramirez

## 2021-04-07 NOTE — HISTORY OF PRESENT ILLNESS
[FreeTextEntry1] : 41 y/o F presents for f/u HTN  [de-identified] : 41 y/o F presents for f/u HTN. Pt was last here 1/22/21 for f/u HTN. At time BP elevated, lisinopril inc from 10 to 20 mg. Pt takes lisinopril 10 mg and metoprolol tartate 50 mg BID, compliant with medications. PT also being followed by cards and pulmonology for dyspnea and exertion she has had for over a year. States sxs unchanged since her last appt. TTE in 1/7/20 showed grade 1 DD + mild eccentric LVH. Holter 1/8 significant for frequent PVCs (13.7%). CXR in Jan 2020 also showed 5 mm nodule, f/u CT small nodule RUL, followed by pulm who states likely 2/2 scarring from previous infection. Last visit with pulm in June 2020, pt states has not had appt due to pandemic. Request pulm referral today. Pt had exercise stress test 1 month ago for eval of CAPONE by cardiologist. Pt states called her cardiologist office who stated will call pt with results of stress test and schedule f/u appointment. \par Pt's BP wnl today. Last time pt here was c/o of blurry visions for few months, requested optho referral. Pt states needs referral printed out again. No hx of wearing glasses or contacts. Denies chest pain, SOB, palpitations, headache, dizziness.

## 2021-04-07 NOTE — REVIEW OF SYSTEMS
[Fever] : no fever [Chills] : no chills [Fatigue] : no fatigue [Night Sweats] : no night sweats [Discharge] : no discharge [Pain] : no pain [Redness] : no redness [Vision Problems] : no vision problems [Itching] : no itching [Earache] : no earache [Hearing Loss] : no hearing loss [Nosebleed] : no nosebleeds [Nasal Discharge] : no nasal discharge [Sore Throat] : no sore throat [Chest Pain] : no chest pain [Palpitations] : no palpitations [Leg Claudication] : no leg claudication [Orthopnea] : no orthopnea [Shortness Of Breath] : no shortness of breath [Wheezing] : no wheezing [Cough] : no cough [Abdominal Pain] : no abdominal pain [Nausea] : no nausea [Constipation] : no constipation [Vomiting] : no vomiting [Heartburn] : no heartburn [Dysuria] : no dysuria [Incontinence] : no incontinence [Nocturia] : no nocturia [Frequency] : no frequency [Joint Pain] : no joint pain [Joint Stiffness] : no joint stiffness [Joint Swelling] : no joint swelling [Muscle Pain] : no muscle pain [Back Pain] : no back pain [Itching] : no itching [Skin Rash] : no skin rash [FreeTextEntry3] : blurry vision

## 2021-04-13 ENCOUNTER — APPOINTMENT (OUTPATIENT)
Dept: FAMILY MEDICINE | Facility: HOSPITAL | Age: 41
End: 2021-04-13

## 2021-04-13 ENCOUNTER — OUTPATIENT (OUTPATIENT)
Dept: OUTPATIENT SERVICES | Facility: HOSPITAL | Age: 41
LOS: 1 days | End: 2021-04-13
Payer: SELF-PAY

## 2021-04-13 VITALS
SYSTOLIC BLOOD PRESSURE: 127 MMHG | HEART RATE: 68 BPM | DIASTOLIC BLOOD PRESSURE: 78 MMHG | BODY MASS INDEX: 30.73 KG/M2 | WEIGHT: 168 LBS | TEMPERATURE: 97.8 F | RESPIRATION RATE: 1 BRPM | OXYGEN SATURATION: 98 %

## 2021-04-13 DIAGNOSIS — Z00.00 ENCOUNTER FOR GENERAL ADULT MEDICAL EXAMINATION WITHOUT ABNORMAL FINDINGS: ICD-10-CM

## 2021-04-13 PROCEDURE — 87624 HPV HI-RISK TYP POOLED RSLT: CPT

## 2021-04-13 PROCEDURE — G0463: CPT

## 2021-04-14 NOTE — HISTORY OF PRESENT ILLNESS
[FreeTextEntry8] : 39 y/o F presents for pap smear. No record of prior pap. Pt states had one many [place] ago [place] but unsure of results. Pt was here 2 weeks ago for HTN followup. BP well controlled. LMP 4/1/21. Pt denies fever, chills, SOB, chest pain, headache, vaginal bleeding, vaginal discharge, abdominal pain, dysuria. No complaints today.\par \par  service provided by [employer]  . \par 's Number: [id number] \par 's Name: Hiram \srinivasan Language: Latvian

## 2021-04-14 NOTE — PLAN
[FreeTextEntry1] : \par 41 y/o F presents for pap smear\par \par #HCM\par -pap and HPV done in office today\par -will f/u cytology and HPV results\par -immunizations up to date\par \par \par RTC 6 months for HTN f/u

## 2021-04-14 NOTE — REVIEW OF SYSTEMS
[Fever] : no fever [Chills] : no chills [Fatigue] : no fatigue [Chest Pain] : no chest pain [Palpitations] : no palpitations [Orthopnea] : no orthopnea [Shortness Of Breath] : no shortness of breath [Wheezing] : no wheezing [Cough] : no cough [Abdominal Pain] : no abdominal pain [Nausea] : no nausea [Constipation] : no constipation [Vomiting] : no vomiting [Heartburn] : no heartburn [Dysuria] : no dysuria [Incontinence] : no incontinence [Nocturia] : no nocturia [Hematuria] : no hematuria [Frequency] : no frequency [Vaginal Discharge] : no vaginal discharge

## 2021-04-14 NOTE — PHYSICAL EXAM
[No Acute Distress] : no acute distress [Well Nourished] : well nourished [Well Developed] : well developed [Well-Appearing] : well-appearing [No Respiratory Distress] : no respiratory distress  [No Accessory Muscle Use] : no accessory muscle use [Clear to Auscultation] : lungs were clear to auscultation bilaterally [Normal Rate] : normal rate  [Regular Rhythm] : with a regular rhythm [Normal S1, S2] : normal S1 and S2 [Soft] : abdomen soft [Non Tender] : non-tender [Non-distended] : non-distended [No HSM] : no HSM [Normal Bowel Sounds] : normal bowel sounds [External Female Genitalia] : normal external genitalia [Cervix] : normal cervix [No Bleeding] : no active bleeding [Atrophy] : no atrophy

## 2021-04-15 DIAGNOSIS — I10 ESSENTIAL (PRIMARY) HYPERTENSION: ICD-10-CM

## 2021-04-15 LAB — HPV HIGH+LOW RISK DNA PNL CVX: NOT DETECTED

## 2021-04-22 LAB — CYTOLOGY CVX/VAG DOC THIN PREP: NORMAL

## 2021-08-18 ENCOUNTER — APPOINTMENT (OUTPATIENT)
Dept: FAMILY MEDICINE | Facility: HOSPITAL | Age: 41
End: 2021-08-18

## 2021-08-18 ENCOUNTER — OUTPATIENT (OUTPATIENT)
Dept: OUTPATIENT SERVICES | Facility: HOSPITAL | Age: 41
LOS: 1 days | End: 2021-08-18
Payer: SELF-PAY

## 2021-08-18 VITALS
TEMPERATURE: 97.1 F | SYSTOLIC BLOOD PRESSURE: 137 MMHG | OXYGEN SATURATION: 99 % | BODY MASS INDEX: 30.73 KG/M2 | DIASTOLIC BLOOD PRESSURE: 85 MMHG | HEART RATE: 80 BPM | HEIGHT: 62 IN | RESPIRATION RATE: 12 BRPM | WEIGHT: 167 LBS

## 2021-08-18 DIAGNOSIS — Z00.00 ENCOUNTER FOR GENERAL ADULT MEDICAL EXAMINATION WITHOUT ABNORMAL FINDINGS: ICD-10-CM

## 2021-08-18 PROCEDURE — G0463: CPT

## 2021-08-18 PROCEDURE — 87338 HPYLORI STOOL AG IA: CPT

## 2021-08-19 DIAGNOSIS — K21.9 GASTRO-ESOPHAGEAL REFLUX DISEASE WITHOUT ESOPHAGITIS: ICD-10-CM

## 2021-08-24 RX ORDER — OMEPRAZOLE 40 MG/1
40 CAPSULE, DELAYED RELEASE ORAL
Qty: 30 | Refills: 2 | Status: DISCONTINUED | COMMUNITY
End: 2021-08-24

## 2021-08-24 NOTE — REVIEW OF SYSTEMS
[Abdominal Pain] : abdominal pain [Heartburn] : heartburn [Fever] : no fever [Chills] : no chills [Fatigue] : no fatigue [Chest Pain] : no chest pain [Palpitations] : no palpitations [Leg Claudication] : no leg claudication [Lower Ext Edema] : no lower extremity edema [Orthopnea] : no orthopnea [Shortness Of Breath] : no shortness of breath [Wheezing] : no wheezing [Cough] : no cough [Dyspnea on Exertion] : no dyspnea on exertion [Nausea] : no nausea [Constipation] : no constipation [Diarrhea] : diarrhea [Vomiting] : no vomiting [Dysuria] : no dysuria [Incontinence] : no incontinence [Nocturia] : no nocturia [Hematuria] : no hematuria [FreeTextEntry7] : U

## 2021-08-24 NOTE — PHYSICAL EXAM
[No Acute Distress] : no acute distress [Well Nourished] : well nourished [Well Developed] : well developed [Well-Appearing] : well-appearing [No Respiratory Distress] : no respiratory distress  [No Accessory Muscle Use] : no accessory muscle use [Clear to Auscultation] : lungs were clear to auscultation bilaterally [Normal Rate] : normal rate  [Regular Rhythm] : with a regular rhythm [Normal S1, S2] : normal S1 and S2 [No Murmur] : no murmur heard [Soft] : abdomen soft [Non-distended] : non-distended [No HSM] : no HSM [Normal Bowel Sounds] : normal bowel sounds [Alert and Oriented x3] : oriented to person, place, and time [Normal Mood] : the mood was normal [Normal Insight/Judgement] : insight and judgment were intact [de-identified] : mild epigastric tenderness

## 2021-08-24 NOTE — HISTORY OF PRESENT ILLNESS
[FreeTextEntry8] : 40 y/o F PMH GERD, HTN presents with c/o of upper abdominal pain for one month. Described as burning sensation, Pain is intermittent, only occurs after eating food. Associated with belching and bloating. Denies nausea, vomiting, diarrhea, constipation, hematochezia, melena, fever, chills, chest pain, SOB, dysuria. Pt is on omeprazole 40 mg, she stopped taking it about a week ago as she feels not helping her symptoms.  She has been using soheila seltzer which she states helps slightly. Pt states was treated for H pylori in past in [place]. Denies alcohol use, cigarette use, recreational drug use. \par \par  service provided by [employer]  \par 's ID Number: [id number]\par Language: Serbian

## 2021-08-24 NOTE — PLAN
[FreeTextEntry1] : \par 40 y/o F PMH GERD, HTN presents with c/o of upper abdominal pain \par \par #Epigastric discomfort \par -having sxs for about 1 month\par -hx of GERD on omeprazole 40 mg, stopped taking 1 week ago b/c no relief\par -txt for H pylori in past, will order stool test for test of cure\par -will start famotidine 20 mg and sucralfate 1 tab 4 times a day\par -recommend losing weight\par -discussed avoid fatty foods, caffeinated products, chocolate, spicy foods, etc\par \par RTC in 2 weeks for f/u abdominal pain\par \par Case discussed with Dr. King\par

## 2021-08-31 LAB — H PYLORI AG STL QL: NOT DETECTED

## 2021-09-08 ENCOUNTER — APPOINTMENT (OUTPATIENT)
Dept: FAMILY MEDICINE | Facility: HOSPITAL | Age: 41
End: 2021-09-08

## 2021-09-08 ENCOUNTER — OUTPATIENT (OUTPATIENT)
Dept: OUTPATIENT SERVICES | Facility: HOSPITAL | Age: 41
LOS: 1 days | End: 2021-09-08
Payer: SELF-PAY

## 2021-09-08 VITALS
BODY MASS INDEX: 30 KG/M2 | OXYGEN SATURATION: 98 % | DIASTOLIC BLOOD PRESSURE: 86 MMHG | TEMPERATURE: 98.2 F | SYSTOLIC BLOOD PRESSURE: 149 MMHG | RESPIRATION RATE: 14 BRPM | HEART RATE: 84 BPM | WEIGHT: 164 LBS

## 2021-09-08 DIAGNOSIS — Z00.00 ENCOUNTER FOR GENERAL ADULT MEDICAL EXAMINATION WITHOUT ABNORMAL FINDINGS: ICD-10-CM

## 2021-09-08 PROCEDURE — G0463: CPT

## 2021-09-13 NOTE — PHYSICAL EXAM
[No Acute Distress] : no acute distress [Well Nourished] : well nourished [Well Developed] : well developed [Well-Appearing] : well-appearing [No Respiratory Distress] : no respiratory distress  [No Accessory Muscle Use] : no accessory muscle use [Clear to Auscultation] : lungs were clear to auscultation bilaterally [Normal Rate] : normal rate  [Regular Rhythm] : with a regular rhythm [Normal S1, S2] : normal S1 and S2 [Soft] : abdomen soft [Non Tender] : non-tender [Non-distended] : non-distended [No HSM] : no HSM [Normal Bowel Sounds] : normal bowel sounds [No Joint Swelling] : no joint swelling [Grossly Normal Strength/Tone] : grossly normal strength/tone

## 2021-09-14 NOTE — HISTORY OF PRESENT ILLNESS
[FreeTextEntry1] : 40 y/o F presents for f/u abdominal pain [de-identified] : 40 y/o F PMH GERD, HTN presents for f/u upper abdominal pain for one month. Pt last seen 2 weeks ago. At the time pt was complaining of  burning sensation, pain is intermittent, only occurs after eating food, associated with belching and bloating. Pt was on omeprazole 40 mg but stopped one week prior as she felt was not helping her symptoms stopped taking it about a week ago as she feels not helping her symptoms. Pt was treated for H pylori in past [place]. Denies alcohol use, cigarette use, recreational drug use. H pylori test ordered which was negative. Pt started on famotidine and sucralfate. Today pt states symptoms improved with medication. Sometimes still has some belching after eating. Denies fever, chills, chest pain, SOB, N/V, abdominal pain, cough, palpitations, leg pain, dizziness, weakness.\par \par  service provided by [employer]  \par 's ID Number: [id number]\par Language: Syriac\par

## 2021-09-14 NOTE — PLAN
[FreeTextEntry1] : 40 y/o F presents for f/u abdominal pain\par \par #GERD\par -sxs improved from previous visit\par -H pylori negative\par -continue famotidine and sucralfate for now\par -life style Modifications discussed \par -elevate the head of the bed by six inches\par -recommend losing weight \par -avoid recumbency for three hours postprandially and not consuming large meals and certain types of food such as caffeinated products, peppermint, fatty foods, chocolate, spicy foods, citrus fruits, tomatoes etc\par \par #HTN\par -BP elevated 149/86\par -Will add lisinopril 10 mg at night, continue taking lisinopril 20 mg in morning\par -Continue metoprolol 50mg BID\par -DASH diet\par \par RTC in 2 weeks for BP check\par \par Case discussed with Dr. King\par

## 2021-09-24 ENCOUNTER — OUTPATIENT (OUTPATIENT)
Dept: OUTPATIENT SERVICES | Facility: HOSPITAL | Age: 41
LOS: 1 days | End: 2021-09-24
Payer: SELF-PAY

## 2021-09-24 ENCOUNTER — APPOINTMENT (OUTPATIENT)
Dept: FAMILY MEDICINE | Facility: HOSPITAL | Age: 41
End: 2021-09-24

## 2021-09-24 VITALS
WEIGHT: 163 LBS | OXYGEN SATURATION: 98 % | BODY MASS INDEX: 29.81 KG/M2 | RESPIRATION RATE: 14 BRPM | DIASTOLIC BLOOD PRESSURE: 81 MMHG | TEMPERATURE: 98 F | HEART RATE: 79 BPM | SYSTOLIC BLOOD PRESSURE: 131 MMHG

## 2021-09-24 DIAGNOSIS — Z87.898 PERSONAL HISTORY OF OTHER SPECIFIED CONDITIONS: ICD-10-CM

## 2021-09-24 DIAGNOSIS — Z86.79 PERSONAL HISTORY OF OTHER DISEASES OF THE CIRCULATORY SYSTEM: ICD-10-CM

## 2021-09-24 DIAGNOSIS — Z00.00 ENCOUNTER FOR GENERAL ADULT MEDICAL EXAMINATION WITHOUT ABNORMAL FINDINGS: ICD-10-CM

## 2021-09-24 PROCEDURE — G0463: CPT

## 2021-09-26 DIAGNOSIS — I10 ESSENTIAL (PRIMARY) HYPERTENSION: ICD-10-CM

## 2021-09-26 DIAGNOSIS — K21.9 GASTRO-ESOPHAGEAL REFLUX DISEASE WITHOUT ESOPHAGITIS: ICD-10-CM

## 2021-09-26 PROBLEM — Z86.79 HISTORY OF HYPERTENSION: Status: RESOLVED | Noted: 2020-01-02 | Resolved: 2021-09-26

## 2021-09-26 PROBLEM — Z87.898 HISTORY OF DIZZINESS: Status: RESOLVED | Noted: 2020-01-02 | Resolved: 2021-09-26

## 2021-09-26 NOTE — PHYSICAL EXAM
[No Acute Distress] : no acute distress [Well Nourished] : well nourished [Normal Sclera/Conjunctiva] : normal sclera/conjunctiva [PERRL] : pupils equal round and reactive to light [Normal Outer Ear/Nose] : the outer ears and nose were normal in appearance [Normal Oropharynx] : the oropharynx was normal [No JVD] : no jugular venous distention [No Lymphadenopathy] : no lymphadenopathy [Supple] : supple [No Respiratory Distress] : no respiratory distress  [No Accessory Muscle Use] : no accessory muscle use [Clear to Auscultation] : lungs were clear to auscultation bilaterally [Normal Rate] : normal rate  [Regular Rhythm] : with a regular rhythm [Normal S1, S2] : normal S1 and S2 [No Murmur] : no murmur heard [Soft] : abdomen soft [Non Tender] : non-tender [Non-distended] : non-distended [No HSM] : no HSM [Normal Bowel Sounds] : normal bowel sounds [No CVA Tenderness] : no CVA  tenderness [No Spinal Tenderness] : no spinal tenderness [No Joint Swelling] : no joint swelling [Grossly Normal Strength/Tone] : grossly normal strength/tone [No Rash] : no rash [Coordination Grossly Intact] : coordination grossly intact [No Focal Deficits] : no focal deficits [Normal Gait] : normal gait [Speech Grossly Normal] : speech grossly normal [Memory Grossly Normal] : memory grossly normal [Normal Affect] : the affect was normal [Alert and Oriented x3] : oriented to person, place, and time [Normal Mood] : the mood was normal [Normal Insight/Judgement] : insight and judgment were intact

## 2021-09-26 NOTE — PHYSICAL EXAM
[No Acute Distress] : no acute distress [Well Nourished] : well nourished [Normal Sclera/Conjunctiva] : normal sclera/conjunctiva [PERRL] : pupils equal round and reactive to light [Normal Outer Ear/Nose] : the outer ears and nose were normal in appearance [Normal Oropharynx] : the oropharynx was normal [No JVD] : no jugular venous distention [No Lymphadenopathy] : no lymphadenopathy [Supple] : supple [No Respiratory Distress] : no respiratory distress  [No Accessory Muscle Use] : no accessory muscle use [Clear to Auscultation] : lungs were clear to auscultation bilaterally [Normal Rate] : normal rate  [Regular Rhythm] : with a regular rhythm [Normal S1, S2] : normal S1 and S2 [No Murmur] : no murmur heard [Soft] : abdomen soft [Non Tender] : non-tender [Non-distended] : non-distended [No HSM] : no HSM [Normal Bowel Sounds] : normal bowel sounds [No CVA Tenderness] : no CVA  tenderness [No Spinal Tenderness] : no spinal tenderness [No Joint Swelling] : no joint swelling [Grossly Normal Strength/Tone] : grossly normal strength/tone [Coordination Grossly Intact] : coordination grossly intact [No Rash] : no rash [No Focal Deficits] : no focal deficits [Normal Gait] : normal gait [Speech Grossly Normal] : speech grossly normal [Memory Grossly Normal] : memory grossly normal [Normal Affect] : the affect was normal [Alert and Oriented x3] : oriented to person, place, and time [Normal Mood] : the mood was normal [Normal Insight/Judgement] : insight and judgment were intact

## 2021-09-26 NOTE — COUNSELING
[Potential consequences of obesity discussed] : Potential consequences of obesity discussed [Encouraged to maintain food diary] : Encouraged to maintain food diary [Benefits of weight loss discussed] : Benefits of weight loss discussed [Encouraged to increase physical activity] : Encouraged to increase physical activity [Encouraged to use exercise tracking device] : Encouraged to use exercise tracking device

## 2021-09-27 NOTE — HISTORY OF PRESENT ILLNESS
[FreeTextEntry1] : 42 y/o F presents for BP check [de-identified] : 40 y/o F PMH GERD, HTN presents for BP check and CPE. Pt last seen 2 weeks ago, BP elevated at time 149/86. Lisinopril increased to 20 mg in the morning and 10 at night. Pt also takes metoprolol 50 mg BID. Pt complaint with medication. Pt does not check BP at home.  Denies fever, chills, chest pain, SOB, N/V, abdominal pain, headache, cough, palpitations, leg pain, dizziness, weakness. Pt deferred flu shot today.\par \par  service provided by [employer]  \par 's ID Number: [id number]\par Language: Mongolian

## 2021-09-27 NOTE — PLAN
[FreeTextEntry1] : 40 y/o F PMH GERD, HTN presents for CPE\par \par #HTN\par -BP improved today\par -continue lisinopril and metoprolol\par -DASH diet\par \par #GERD\par -continue famotidine and sucralfate\par -life style Modifications discussed \par \par #Overweight\par -Discussed the benefits of weight loss with pt. Pt is receptive to lifestyle modification techniques to lose weight - at least 30mins-1hr aerobic exercises/day x5 days per week advised \par - Decreased food portion sizes, increase in whole grains, assorted vegetables and fruits and decreased sugar beverages discussed and encouraged \par \par #HCM\par -pap +HPV wnl 4/13/21\par -flu shot deferred today\par \par RTC in 3 months for HTN mgmt\par \par Case discussed with Dr. Brown

## 2021-09-27 NOTE — HEALTH RISK ASSESSMENT
[Good] : ~his/her~  mood as  good [Significant Other] : lives with significant other [Fully functional (bathing, dressing, toileting, transferring, walking, feeding)] : Fully functional (bathing, dressing, toileting, transferring, walking, feeding) [Fully functional (using the telephone, shopping, preparing meals, housekeeping, doing laundry, using] : Fully functional and needs no help or supervision to perform IADLs (using the telephone, shopping, preparing meals, housekeeping, doing laundry, using transportation, managing medications and managing finances) [No] : No [No falls in past year] : Patient reported no falls in the past year [None] : None [With Family] : lives with family [Employed] : employed [] : No [Change in mental status noted] : No change in mental status noted [Sexually Active] : not sexually active [Reports changes in hearing] : Reports no changes in hearing [Reports changes in dental health] : Reports no changes in dental health [Reports changes in vision] : Reports no changes in vision [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

## 2021-09-27 NOTE — HISTORY OF PRESENT ILLNESS
[FreeTextEntry1] : 40 y/o F presents for BP check [de-identified] : 40 y/o F PMH GERD, HTN presents for BP check and CPE. Pt last seen 2 weeks ago, BP elevated at time 149/86. Lisinopril increased to 20 mg in the morning and 10 at night. Pt also takes metoprolol 50 mg BID. Pt complaint with medication. Pt does not check BP at home.  Denies fever, chills, chest pain, SOB, N/V, abdominal pain, headache, cough, palpitations, leg pain, dizziness, weakness. Pt deferred flu shot today.\par \par  service provided by [employer]  \par 's ID Number: [id number]\par Language: Slovak

## 2021-09-27 NOTE — HEALTH RISK ASSESSMENT
[Good] : ~his/her~  mood as  good [Significant Other] : lives with significant other [Fully functional (bathing, dressing, toileting, transferring, walking, feeding)] : Fully functional (bathing, dressing, toileting, transferring, walking, feeding) [Fully functional (using the telephone, shopping, preparing meals, housekeeping, doing laundry, using] : Fully functional and needs no help or supervision to perform IADLs (using the telephone, shopping, preparing meals, housekeeping, doing laundry, using transportation, managing medications and managing finances) [No] : No [No falls in past year] : Patient reported no falls in the past year [None] : None [With Family] : lives with family [Employed] : employed [] : No [Change in mental status noted] : No change in mental status noted [Sexually Active] : not sexually active [Reports changes in hearing] : Reports no changes in hearing [Reports changes in vision] : Reports no changes in vision [Reports changes in dental health] : Reports no changes in dental health [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

## 2021-12-16 ENCOUNTER — OUTPATIENT (OUTPATIENT)
Dept: OUTPATIENT SERVICES | Facility: HOSPITAL | Age: 41
LOS: 1 days | End: 2021-12-16
Payer: SELF-PAY

## 2021-12-16 ENCOUNTER — APPOINTMENT (OUTPATIENT)
Dept: FAMILY MEDICINE | Facility: HOSPITAL | Age: 41
End: 2021-12-16

## 2021-12-16 ENCOUNTER — RESULT REVIEW (OUTPATIENT)
Age: 41
End: 2021-12-16

## 2021-12-16 ENCOUNTER — RESULT CHARGE (OUTPATIENT)
Age: 41
End: 2021-12-16

## 2021-12-16 VITALS
WEIGHT: 160 LBS | SYSTOLIC BLOOD PRESSURE: 135 MMHG | OXYGEN SATURATION: 98 % | DIASTOLIC BLOOD PRESSURE: 86 MMHG | HEART RATE: 83 BPM | RESPIRATION RATE: 14 BRPM | TEMPERATURE: 97.3 F | BODY MASS INDEX: 29.26 KG/M2

## 2021-12-16 DIAGNOSIS — Z00.00 ENCOUNTER FOR GENERAL ADULT MEDICAL EXAMINATION WITHOUT ABNORMAL FINDINGS: ICD-10-CM

## 2021-12-16 LAB
HCG UR QL: NEGATIVE
QUALITY CONTROL: YES

## 2021-12-16 PROCEDURE — 83002 ASSAY OF GONADOTROPIN (LH): CPT

## 2021-12-16 PROCEDURE — 84702 CHORIONIC GONADOTROPIN TEST: CPT

## 2021-12-16 PROCEDURE — 84443 ASSAY THYROID STIM HORMONE: CPT

## 2021-12-16 PROCEDURE — 83001 ASSAY OF GONADOTROPIN (FSH): CPT

## 2021-12-16 PROCEDURE — 82670 ASSAY OF TOTAL ESTRADIOL: CPT

## 2021-12-16 PROCEDURE — 84146 ASSAY OF PROLACTIN: CPT

## 2021-12-16 PROCEDURE — G0463: CPT

## 2021-12-16 NOTE — REVIEW OF SYSTEMS
[Hot Flashes] : hot flashes [Constipation] : constipation [Headache] : headache [Negative] : Heme/Lymph

## 2021-12-16 NOTE — PHYSICAL EXAM
[Normal Appearance] : normal in appearance [No Masses] : no palpable masses [No Nipple Discharge] : no nipple discharge [No Axillary Lymphadenopathy] : no axillary lymphadenopathy [Normal] : affect was normal and insight and judgment were intact [de-identified] : (+)ttp @4o'clock position of the left breast.

## 2021-12-16 NOTE — HISTORY OF PRESENT ILLNESS
[[employer] ] : provided by [employer]   [Time Spent: ____ minutes] : Total time spent using  services: [unfilled] minutes. The patient's primary language is not English thus required  services. [FreeTextEntry8] : 42 y/o F with PMHx of HTN, GERD presents to clinic for missed period and left breast pain. Patient states that her LMP was 09/09/21. States prior to that she used to have regular menses. Currently complaints of constipation. Reports of intermittent headaches. Patient also complains of left breast pain, started about 2-3 months ago. No trauma/injury. Patient denies breast swelling, nipple discharge, dimpling of the skin, nipple retraction, axillary fullness or overlying skin change. No Hx of breast cancer of known FHx of breast cancer. Has no other complaints. Patient denies fever, chills, dizziness, change in vision, blurry vision,chest pain, palpitations, shortness of breath, abdominal pain, nausea, vomiting, diarrhea, hematochezia, melena, change in bowel movement, weight loss, dysuria, urinary frequency, urgency, hematuria, numbness, weakness or tingling.  [Interpreters_IDNumber] : [id number] [Interpreters_FullName] : Jaiden  [TWNoteComboBox1] : [place]

## 2021-12-16 NOTE — ASSESSMENT
[FreeTextEntry1] : 40 y/o F with PMHx of HTN, GERD presents to clinic for missed period and left breast pain\par \par \par #Amenorrhea \par -LMP: 09/09/21\par -POCT b-HCG: negative \par -f/u on Prolactin, TSH, FSH, LH, estriol and serum hcg \par \par \par #Headache\par -do not suspect primary headaches\par -no alarming signs \par -advised to maintain headache diary \par -Naproxen 375mg BID, advised to take with food and water \par \par #Mastalgia of left breast \par -f/u on US and Mammogram \par -Naproxen as need for pain PRN \par \par \par #HTN \par -continue lisinopril and metoprolol\par -DASH diet\par \par #HLD\par -lifestyle modifications discussed with patient \par \par #GERD\par -c/w Famotidine and Sucralfate \par -advised to take Naproxen as need for pain and with food and water. D/C if symptoms worsen. \par \par #Constipation \par -increase water and fiber intake \par -can take OTC laxatives \par -\par \par #HCM\par -pap +HPV wnl 4/13/21\par -flu shot deferred today\par \par \par #RTC in 2-3 weeks for f/u \par \par \par Case and plan discussed with Dr. Mark Cottrell

## 2021-12-16 NOTE — PAST MEDICAL HISTORY
[Definite ___ (Date)] : the last menstrual period was [unfilled] [Normal Amount/Duration] : it was of a normal amount and duration [Normal Duration] : the duration was normal [Amenorrhea] : amenorrhea

## 2021-12-17 LAB
ESTRADIOL SERPL-MCNC: 24 PG/ML
FSH SERPL-MCNC: 10.7 IU/L
HCG SERPL-MCNC: <1 MIU/ML
LH SERPL-ACNC: 8 IU/L
PROLACTIN SERPL-MCNC: 15.2 NG/ML
TSH SERPL-ACNC: 0.92 UIU/ML

## 2021-12-19 DIAGNOSIS — N64.4 MASTODYNIA: ICD-10-CM

## 2021-12-19 DIAGNOSIS — I10 ESSENTIAL (PRIMARY) HYPERTENSION: ICD-10-CM

## 2021-12-23 ENCOUNTER — APPOINTMENT (OUTPATIENT)
Dept: ULTRASOUND IMAGING | Facility: CLINIC | Age: 41
End: 2021-12-23
Payer: COMMERCIAL

## 2021-12-29 ENCOUNTER — RESULT REVIEW (OUTPATIENT)
Age: 41
End: 2021-12-29

## 2021-12-29 ENCOUNTER — APPOINTMENT (OUTPATIENT)
Dept: ULTRASOUND IMAGING | Facility: CLINIC | Age: 41
End: 2021-12-29
Payer: COMMERCIAL

## 2021-12-29 ENCOUNTER — OUTPATIENT (OUTPATIENT)
Dept: OUTPATIENT SERVICES | Facility: HOSPITAL | Age: 41
LOS: 1 days | End: 2021-12-29
Payer: SELF-PAY

## 2021-12-29 ENCOUNTER — APPOINTMENT (OUTPATIENT)
Dept: MAMMOGRAPHY | Facility: CLINIC | Age: 41
End: 2021-12-29
Payer: COMMERCIAL

## 2021-12-29 DIAGNOSIS — N64.4 MASTODYNIA: ICD-10-CM

## 2021-12-29 PROCEDURE — 77067 SCR MAMMO BI INCL CAD: CPT

## 2021-12-29 PROCEDURE — 76641 ULTRASOUND BREAST COMPLETE: CPT

## 2021-12-29 PROCEDURE — 77067 SCR MAMMO BI INCL CAD: CPT | Mod: 26

## 2021-12-29 PROCEDURE — 77063 BREAST TOMOSYNTHESIS BI: CPT | Mod: 26

## 2021-12-29 PROCEDURE — 76641 ULTRASOUND BREAST COMPLETE: CPT | Mod: 26,50

## 2021-12-29 PROCEDURE — 77063 BREAST TOMOSYNTHESIS BI: CPT

## 2022-01-14 ENCOUNTER — OUTPATIENT (OUTPATIENT)
Dept: OUTPATIENT SERVICES | Facility: HOSPITAL | Age: 42
LOS: 1 days | End: 2022-01-14
Payer: SELF-PAY

## 2022-01-14 ENCOUNTER — APPOINTMENT (OUTPATIENT)
Dept: FAMILY MEDICINE | Facility: HOSPITAL | Age: 42
End: 2022-01-14

## 2022-01-14 VITALS
DIASTOLIC BLOOD PRESSURE: 83 MMHG | TEMPERATURE: 97.2 F | WEIGHT: 168 LBS | HEART RATE: 108 BPM | BODY MASS INDEX: 30.91 KG/M2 | SYSTOLIC BLOOD PRESSURE: 138 MMHG | RESPIRATION RATE: 16 BRPM | OXYGEN SATURATION: 98 % | HEIGHT: 62 IN

## 2022-01-14 DIAGNOSIS — Z00.00 ENCOUNTER FOR GENERAL ADULT MEDICAL EXAMINATION WITHOUT ABNORMAL FINDINGS: ICD-10-CM

## 2022-01-14 PROCEDURE — G0463: CPT

## 2022-01-18 DIAGNOSIS — N91.2 AMENORRHEA, UNSPECIFIED: ICD-10-CM

## 2022-01-23 RX ORDER — NAPROXEN 375 MG/1
375 TABLET ORAL
Qty: 20 | Refills: 0 | Status: DISCONTINUED | COMMUNITY
Start: 2021-12-16 | End: 2022-01-23

## 2022-01-23 NOTE — HISTORY OF PRESENT ILLNESS
[FreeTextEntry1] : 42 y/o F presents for BP check and f/u amenorrhea [de-identified] : 42 y/o F PMH HTN, GERD, HL  presenting for BP check and f/u amenorrhea. Pt seen one month ago for acute visit for amenorrhea, LMP in September. Workup done at time including B HCG, prolactin, FSH, LH. Pt states since then did have some spotting in October but still has not had period. Prior to this used to have normal periods. Age of menarche 19. Pt also at the time c/o of L breast pain 2-3 months, US and  mammo done  showed probably benign cyst cluster R breast at 12 o'clock position, recommend repeat in 6 months. Pt taking lisinopril 10 mg tablet in morning and 2 tab at night, taking medication as prescribed. Denies fever, chills, chest pain, SOB, N/V, abdominal pain, headache, cough, palpitations, leg pain, dizziness, weakness

## 2022-01-23 NOTE — ASSESSMENT
[FreeTextEntry1] : 40 y/o F presents for BP check and f/u amenorrhea\par \par #HTN\par -BP stable\par -continue lisinopril and metoprolol\par -DASH diet\par \par #Amenorrhea\par -LMP: 09/09/21\par -reviewed Prolactin, TSH, FSH, LH, estriol and serum hcg, pt has low FSH and LH\par -suggestive of hypothalamic hypogonadism\par -will start birth control tri-lo-sprintec\par -f/u in one month \par \par #R breast cyst cluster\par -Probable benign cyst cluster right breast 12:00 2 cm from the nipple noted on mammo and US 12/29/21 \par -pt need follow-up targeted right breast ultrasound in 6 months \par \par #Obesity\par - Discussed the benefits of weight loss with pt, and risks associated with obesity. Pt is receptive to lifestyle modification techniques to lose weight - at least 30mins-1hr aerobic exercises/day x5 days per week advised \par - Decreased food portion sizes, increase in whole grains, assorted vegetables and fruits and decreased sugar beverages discussed and encouraged \par \par #HCM\par -pap +HPV wnl 4/13/21\par -flu shot deferred today\par \par Case discussed with Dr. Cottrell and Dr. Lovell

## 2022-01-23 NOTE — REVIEW OF SYSTEMS
[Fever] : no fever [Chills] : no chills [Fatigue] : no fatigue [Hot Flashes] : no hot flashes [Chest Pain] : no chest pain [Palpitations] : no palpitations [Leg Claudication] : no leg claudication [Lower Ext Edema] : no lower extremity edema [Shortness Of Breath] : no shortness of breath [Wheezing] : no wheezing [Cough] : no cough [Dyspnea on Exertion] : no dyspnea on exertion [Abdominal Pain] : no abdominal pain [Nausea] : no nausea [Constipation] : no constipation [Diarrhea] : diarrhea [Vomiting] : no vomiting [Heartburn] : no heartburn [Melena] : no melena [Dysuria] : no dysuria [Incontinence] : no incontinence [Nocturia] : no nocturia [Poor Libido] : libido not poor [Hematuria] : no hematuria [Frequency] : no frequency [Vaginal Discharge] : no vaginal discharge [Dysmenorrhea] : no dysmenorrhea [Headache] : no headache [Dizziness] : no dizziness [Fainting] : no fainting [FreeTextEntry8] : amenorrhea

## 2022-02-11 ENCOUNTER — OUTPATIENT (OUTPATIENT)
Dept: OUTPATIENT SERVICES | Facility: HOSPITAL | Age: 42
LOS: 1 days | End: 2022-02-11
Payer: SELF-PAY

## 2022-02-11 ENCOUNTER — APPOINTMENT (OUTPATIENT)
Dept: FAMILY MEDICINE | Facility: HOSPITAL | Age: 42
End: 2022-02-11

## 2022-02-11 VITALS
HEART RATE: 78 BPM | DIASTOLIC BLOOD PRESSURE: 87 MMHG | RESPIRATION RATE: 16 BRPM | OXYGEN SATURATION: 99 % | TEMPERATURE: 98.1 F | WEIGHT: 161 LBS | SYSTOLIC BLOOD PRESSURE: 150 MMHG | BODY MASS INDEX: 29.45 KG/M2

## 2022-02-11 DIAGNOSIS — Z00.00 ENCOUNTER FOR GENERAL ADULT MEDICAL EXAMINATION WITHOUT ABNORMAL FINDINGS: ICD-10-CM

## 2022-02-11 PROCEDURE — G0463: CPT

## 2022-02-15 ENCOUNTER — APPOINTMENT (OUTPATIENT)
Dept: FAMILY MEDICINE | Facility: HOSPITAL | Age: 42
End: 2022-02-15

## 2022-02-15 RX ORDER — NORGESTIMATE AND ETHINYL ESTRADIOL 7DAYSX3 LO
0.18/0.215/0.25 KIT ORAL DAILY
Qty: 1 | Refills: 0 | Status: COMPLETED | COMMUNITY
Start: 2022-01-14 | End: 2022-02-15

## 2022-02-15 NOTE — HISTORY OF PRESENT ILLNESS
[FreeTextEntry1] : f/u amenorrhea [de-identified] : 40 y/o F PMHx HTN, GERD, HLD presenting for f/u of amenorrhea. Was seen approx 1 month ago after w/u for amenorrhea. Found to have low FSH, Low LH and prescribed tri-lo-sprintec for hypothalamic hypogonadism. Pt states that she had her period on 1/24/22 and lasted 3 days. She did not start her OCP's because her period started 1 day prior to her planned start. Otherwise, pt is feeling well today, would like to get COVID booster, received J&J vaccine 04/2021\par Pt's BP slightly elevated today, forgot to take am dose today.\par Pt is refusing flu vaccination today

## 2022-02-15 NOTE — PLAN
[FreeTextEntry1] : \par \par #HCM\par -refusing flu vaccine at this time\par \par RTC for COVID19 booster vaccine, not available today\par \par Case d/w Dr. Beard and Dr. James

## 2022-02-17 ENCOUNTER — APPOINTMENT (OUTPATIENT)
Dept: FAMILY MEDICINE | Facility: HOSPITAL | Age: 42
End: 2022-02-17

## 2022-02-17 ENCOUNTER — OUTPATIENT (OUTPATIENT)
Dept: OUTPATIENT SERVICES | Facility: HOSPITAL | Age: 42
LOS: 1 days | End: 2022-02-17
Payer: SELF-PAY

## 2022-02-17 VITALS
WEIGHT: 160 LBS | HEART RATE: 93 BPM | BODY MASS INDEX: 29.26 KG/M2 | SYSTOLIC BLOOD PRESSURE: 133 MMHG | TEMPERATURE: 98.7 F | DIASTOLIC BLOOD PRESSURE: 80 MMHG | RESPIRATION RATE: 17 BRPM

## 2022-02-17 DIAGNOSIS — Z00.00 ENCOUNTER FOR GENERAL ADULT MEDICAL EXAMINATION WITHOUT ABNORMAL FINDINGS: ICD-10-CM

## 2022-02-17 DIAGNOSIS — N91.2 AMENORRHEA, UNSPECIFIED: ICD-10-CM

## 2022-02-17 DIAGNOSIS — I10 ESSENTIAL (PRIMARY) HYPERTENSION: ICD-10-CM

## 2022-02-17 PROCEDURE — G0463: CPT

## 2022-02-17 PROCEDURE — 90471 IMMUNIZATION ADMIN: CPT

## 2022-02-17 NOTE — PHYSICAL EXAM
[No Acute Distress] : no acute distress [Well Nourished] : well nourished [Well Developed] : well developed [No Respiratory Distress] : no respiratory distress  [No Accessory Muscle Use] : no accessory muscle use [Clear to Auscultation] : lungs were clear to auscultation bilaterally [No Rash] : no rash

## 2022-02-24 NOTE — REVIEW OF SYSTEMS
[Fever] : no fever [Chills] : no chills [Night Sweats] : no night sweats [Chest Pain] : no chest pain [Shortness Of Breath] : no shortness of breath [Wheezing] : no wheezing [Cough] : no cough

## 2022-02-24 NOTE — HISTORY OF PRESENT ILLNESS
[FreeTextEntry8] : Pt is a 40 y/o F who completed second dose of COVID 19 vaccine in 4/2021 who presents forcovid booster. Pt reports she didn’t experienced symptoms with prior COVID vaccination. Pt denies hx of allergies or anaphylaxis. She denies any recent exposure to COVID 19 infected persons. No additional complaints.\par

## 2022-02-24 NOTE — PLAN
[FreeTextEntry1] : \par #COVID 19 immunizations \par - Pt received second dose of Moderna 4/21\par - Booster covid vacine administered today\par - Pt instructed to call the clinic if develop SOB, diffuse rash, chest pain and any other symptoms\par \par RTC for BP check with PCP\par

## 2022-02-25 DIAGNOSIS — Z23 ENCOUNTER FOR IMMUNIZATION: ICD-10-CM

## 2022-03-16 ENCOUNTER — APPOINTMENT (OUTPATIENT)
Dept: FAMILY MEDICINE | Facility: HOSPITAL | Age: 42
End: 2022-03-16

## 2022-03-16 ENCOUNTER — OUTPATIENT (OUTPATIENT)
Dept: OUTPATIENT SERVICES | Facility: HOSPITAL | Age: 42
LOS: 1 days | End: 2022-03-16
Payer: SELF-PAY

## 2022-03-16 VITALS
HEIGHT: 62 IN | HEART RATE: 85 BPM | WEIGHT: 165 LBS | DIASTOLIC BLOOD PRESSURE: 78 MMHG | RESPIRATION RATE: 16 BRPM | SYSTOLIC BLOOD PRESSURE: 130 MMHG | TEMPERATURE: 97 F | BODY MASS INDEX: 30.36 KG/M2 | OXYGEN SATURATION: 98 %

## 2022-03-16 DIAGNOSIS — Z00.00 ENCOUNTER FOR GENERAL ADULT MEDICAL EXAMINATION WITHOUT ABNORMAL FINDINGS: ICD-10-CM

## 2022-03-16 PROCEDURE — G0463: CPT

## 2022-03-29 DIAGNOSIS — N91.2 AMENORRHEA, UNSPECIFIED: ICD-10-CM

## 2022-03-29 DIAGNOSIS — I10 ESSENTIAL (PRIMARY) HYPERTENSION: ICD-10-CM

## 2022-08-17 DIAGNOSIS — R06.09 OTHER FORMS OF DYSPNEA: ICD-10-CM

## 2022-08-17 DIAGNOSIS — Z87.42 PERSONAL HISTORY OF OTHER DISEASES OF THE FEMALE GENITAL TRACT: ICD-10-CM

## 2022-08-18 ENCOUNTER — APPOINTMENT (OUTPATIENT)
Dept: FAMILY MEDICINE | Facility: HOSPITAL | Age: 42
End: 2022-08-18

## 2022-08-18 ENCOUNTER — RESULT REVIEW (OUTPATIENT)
Age: 42
End: 2022-08-18

## 2022-08-18 ENCOUNTER — OUTPATIENT (OUTPATIENT)
Dept: OUTPATIENT SERVICES | Facility: HOSPITAL | Age: 42
LOS: 1 days | End: 2022-08-18
Payer: SELF-PAY

## 2022-08-18 VITALS
DIASTOLIC BLOOD PRESSURE: 78 MMHG | HEIGHT: 62 IN | HEART RATE: 68 BPM | RESPIRATION RATE: 16 BRPM | BODY MASS INDEX: 29.81 KG/M2 | OXYGEN SATURATION: 97 % | TEMPERATURE: 97.6 F | SYSTOLIC BLOOD PRESSURE: 125 MMHG | WEIGHT: 162 LBS

## 2022-08-18 DIAGNOSIS — Z00.00 ENCOUNTER FOR GENERAL ADULT MEDICAL EXAMINATION WITHOUT ABNORMAL FINDINGS: ICD-10-CM

## 2022-08-18 DIAGNOSIS — Z87.898 PERSONAL HISTORY OF OTHER SPECIFIED CONDITIONS: ICD-10-CM

## 2022-08-18 DIAGNOSIS — N64.4 MASTODYNIA: ICD-10-CM

## 2022-08-18 DIAGNOSIS — E78.5 HYPERLIPIDEMIA, UNSPECIFIED: ICD-10-CM

## 2022-08-18 PROCEDURE — 82306 VITAMIN D 25 HYDROXY: CPT

## 2022-08-18 PROCEDURE — 83036 HEMOGLOBIN GLYCOSYLATED A1C: CPT

## 2022-08-18 PROCEDURE — 80061 LIPID PANEL: CPT

## 2022-08-18 PROCEDURE — 80053 COMPREHEN METABOLIC PANEL: CPT

## 2022-08-18 PROCEDURE — 84443 ASSAY THYROID STIM HORMONE: CPT

## 2022-08-18 PROCEDURE — G0463: CPT

## 2022-08-18 RX ORDER — SUCRALFATE 1 G/1
1 TABLET ORAL 4 TIMES DAILY
Qty: 120 | Refills: 0 | Status: DISCONTINUED | COMMUNITY
Start: 2021-08-18 | End: 2022-08-18

## 2022-08-18 NOTE — HISTORY OF PRESENT ILLNESS
[FreeTextEntry1] : 41 y/o F presents for BP check  [de-identified] : 41 y/o F PMH HTN, GERD, HLD presents for BP check. Pt taking lisinopril and metoprolol as prescribed. Pt does not check BP at home. Pt also mentions for the past few months trouble reading close. Pt does not use glasses. Pt also states need US referral for repeat US. Pt had mammo and US done 12/2021 which showed probable benign cyst cluster right breast 12:00 2 cm from the nipple for which follow-up targeted right breast ultrasound in 6 months is recommended. LMP 7/26/22. Denies fever, chills, chest pain, SOB, N/V, abdominal pain, headache, cough, palpitations, leg pain, dizziness, weakness\par

## 2022-08-18 NOTE — PHYSICAL EXAM
[Normal Sclera/Conjunctiva] : normal sclera/conjunctiva [PERRL] : pupils equal round and reactive to light [EOMI] : extraocular movements intact [Fundoscopic Exam Performed] : fundoscopic ~T exam ~C was performed [Normal] : soft, non-tender, non-distended, no masses palpated, no HSM and normal bowel sounds

## 2022-08-18 NOTE — REVIEW OF SYSTEMS
[Fever] : no fever [Chills] : no chills [Fatigue] : no fatigue [Hot Flashes] : no hot flashes [Night Sweats] : no night sweats [Recent Change In Weight] : ~T no recent weight change [Discharge] : no discharge [Pain] : no pain [Redness] : no redness [Dryness] : no dryness  [Vision Problems] : vision problems [Itching] : no itching [Chest Pain] : no chest pain [Palpitations] : no palpitations [Leg Claudication] : no leg claudication [Lower Ext Edema] : no lower extremity edema [Orthopnea] : no orthopnea [Paroxysmal Nocturnal Dyspnea] : no paroxysmal nocturnal dyspnea [Shortness Of Breath] : no shortness of breath [Wheezing] : no wheezing [Cough] : no cough [Dyspnea on Exertion] : no dyspnea on exertion [Abdominal Pain] : no abdominal pain [Nausea] : no nausea [Constipation] : no constipation [Diarrhea] : diarrhea [Vomiting] : no vomiting [Heartburn] : no heartburn [Melena] : no melena

## 2022-08-19 LAB
ALBUMIN SERPL ELPH-MCNC: 4.6 G/DL
ALP BLD-CCNC: 71 U/L
ALT SERPL-CCNC: 27 U/L
ANION GAP SERPL CALC-SCNC: 15 MMOL/L
AST SERPL-CCNC: 21 U/L
BILIRUB SERPL-MCNC: 0.3 MG/DL
BUN SERPL-MCNC: 10 MG/DL
CALCIUM SERPL-MCNC: 9.3 MG/DL
CHLORIDE SERPL-SCNC: 106 MMOL/L
CHOLEST SERPL-MCNC: 191 MG/DL
CO2 SERPL-SCNC: 21 MMOL/L
CREAT SERPL-MCNC: 0.48 MG/DL
EGFR: 122 ML/MIN/1.73M2
ESTIMATED AVERAGE GLUCOSE: 111 MG/DL
GLUCOSE SERPL-MCNC: 102 MG/DL
HBA1C MFR BLD HPLC: 5.5 %
HDLC SERPL-MCNC: 39 MG/DL
LDLC SERPL CALC-MCNC: 120 MG/DL
NONHDLC SERPL-MCNC: 152 MG/DL
POTASSIUM SERPL-SCNC: 4.1 MMOL/L
PROT SERPL-MCNC: 7.3 G/DL
SODIUM SERPL-SCNC: 142 MMOL/L
TRIGL SERPL-MCNC: 163 MG/DL
TSH SERPL-ACNC: 0.86 UIU/ML

## 2022-08-22 DIAGNOSIS — E78.5 HYPERLIPIDEMIA, UNSPECIFIED: ICD-10-CM

## 2022-08-22 DIAGNOSIS — N60.01 SOLITARY CYST OF RIGHT BREAST: ICD-10-CM

## 2022-08-22 DIAGNOSIS — H53.8 OTHER VISUAL DISTURBANCES: ICD-10-CM

## 2022-08-22 DIAGNOSIS — I10 ESSENTIAL (PRIMARY) HYPERTENSION: ICD-10-CM

## 2022-09-06 NOTE — ED ADULT NURSE NOTE - MUSCULOSKELETAL ASSESSMENT
General: Class 3 obesity with ORC.   Start weight on phentermine: 245lbs-> 237lbs      Nutrition: reinforced dietary modification. Working on cutting down on carbs/sugar meena night time eating. Referral to RD     Physical Activity: lower back stretches given. discussed stretches before and after exercise. Cont. Walking with gradual increase in intensity and duration.   Will look into water-exercises and biking     Behaviour: Continue lifestyle intervention/behavioural therapy                 Medications: discuss benefits, risks and potential contraindications of antiobesity medications.                Cont. Phentermine at 15mg daily                On topiramate for migraine HA (adjunct weight loss)    Reviewed:  o Nutrition and the importance of regular protein intake  o Importance of physical activity and reducing sedentary time  o Treatment plan        WDL

## 2022-09-09 LAB — 25(OH)D3 SERPL-MCNC: 16.5 NG/ML

## 2022-09-22 ENCOUNTER — RESULT CHARGE (OUTPATIENT)
Age: 42
End: 2022-09-22

## 2022-09-22 ENCOUNTER — OUTPATIENT (OUTPATIENT)
Dept: OUTPATIENT SERVICES | Facility: HOSPITAL | Age: 42
LOS: 1 days | End: 2022-09-22
Payer: SELF-PAY

## 2022-09-22 ENCOUNTER — APPOINTMENT (OUTPATIENT)
Dept: FAMILY MEDICINE | Facility: HOSPITAL | Age: 42
End: 2022-09-22

## 2022-09-22 DIAGNOSIS — Z00.00 ENCOUNTER FOR GENERAL ADULT MEDICAL EXAMINATION WITHOUT ABNORMAL FINDINGS: ICD-10-CM

## 2022-09-22 PROCEDURE — G0463: CPT

## 2022-09-23 DIAGNOSIS — Z20.822 CONTACT WITH AND (SUSPECTED) EXPOSURE TO COVID-19: ICD-10-CM

## 2022-09-24 ENCOUNTER — OUTPATIENT (OUTPATIENT)
Dept: OUTPATIENT SERVICES | Facility: HOSPITAL | Age: 42
LOS: 1 days | End: 2022-09-24
Payer: SELF-PAY

## 2022-09-24 ENCOUNTER — APPOINTMENT (OUTPATIENT)
Dept: FAMILY MEDICINE | Facility: HOSPITAL | Age: 42
End: 2022-09-24

## 2022-09-24 VITALS
RESPIRATION RATE: 16 BRPM | BODY MASS INDEX: 29.26 KG/M2 | HEART RATE: 78 BPM | SYSTOLIC BLOOD PRESSURE: 138 MMHG | WEIGHT: 160 LBS | OXYGEN SATURATION: 98 % | DIASTOLIC BLOOD PRESSURE: 85 MMHG | TEMPERATURE: 98.1 F

## 2022-09-24 DIAGNOSIS — Z00.00 ENCOUNTER FOR GENERAL ADULT MEDICAL EXAMINATION WITHOUT ABNORMAL FINDINGS: ICD-10-CM

## 2022-09-24 PROCEDURE — G0463: CPT

## 2022-09-24 RX ORDER — FAMOTIDINE 20 MG/1
20 TABLET, FILM COATED ORAL DAILY
Qty: 30 | Refills: 2 | Status: COMPLETED | COMMUNITY
Start: 2021-08-18 | End: 2022-09-24

## 2022-09-25 NOTE — REVIEW OF SYSTEMS
[Fatigue] : fatigue [Negative] : Psychiatric [Chest Pain] : chest pain [Shortness Of Breath] : shortness of breath [Dyspnea on Exertion] : dyspnea on exertion [Heartburn] : heartburn [Palpitations] : no palpitations [Lower Ext Edema] : no lower extremity edema

## 2022-09-25 NOTE — HISTORY OF PRESENT ILLNESS
[FreeTextEntry1] : FU  [de-identified] : 43 yo F presents with cc of bloating, burping associated with fatigue and dizziness. Symptoms are episodical, lasting from 60-90 minutes and happening daily after meals. Pt is unable to identify certain food that causes sxs, reports stopped drinking coffee since 1 month ago as was worsening sxs. \par Pt also reports persistent goss, was evaluated by cardiology, non invasive stratification with stress test showed TWI, htn, TTE with diastolic dysfunction, however pt never followed up with cardio. She reports occasional cp, sob associated with the bloating, not present today.

## 2022-09-26 DIAGNOSIS — R94.39 ABNORMAL RESULT OF OTHER CARDIOVASCULAR FUNCTION STUDY: ICD-10-CM

## 2022-09-26 DIAGNOSIS — K21.9 GASTRO-ESOPHAGEAL REFLUX DISEASE WITHOUT ESOPHAGITIS: ICD-10-CM

## 2022-09-26 DIAGNOSIS — R14.0 ABDOMINAL DISTENSION (GASEOUS): ICD-10-CM

## 2022-09-28 ENCOUNTER — APPOINTMENT (OUTPATIENT)
Dept: ULTRASOUND IMAGING | Facility: CLINIC | Age: 42
End: 2022-09-28

## 2022-09-28 ENCOUNTER — RESULT REVIEW (OUTPATIENT)
Age: 42
End: 2022-09-28

## 2022-09-28 ENCOUNTER — OUTPATIENT (OUTPATIENT)
Dept: OUTPATIENT SERVICES | Facility: HOSPITAL | Age: 42
LOS: 1 days | End: 2022-09-28
Payer: COMMERCIAL

## 2022-09-28 DIAGNOSIS — N60.01 SOLITARY CYST OF RIGHT BREAST: ICD-10-CM

## 2022-09-28 PROCEDURE — 76642 ULTRASOUND BREAST LIMITED: CPT | Mod: 26,RT

## 2022-09-28 PROCEDURE — 76642 ULTRASOUND BREAST LIMITED: CPT

## 2022-10-01 NOTE — HISTORY OF PRESENT ILLNESS
[Home] : at home, [unfilled] , at the time of the visit. [Medical Office: ([facility])___] : at the medical office located in  [FreeTextEntry1] : 43 y/o F presents with c/o of fatigue, sore throat and body ache that started one week ago. Denies fever, chills, cough, chest pain, SOB, abdominal pain, loss of taste, loss of smell, loss of appetite. Taking tylenol PRN. Denies sick contacts. Pt has received COVID vaccine.

## 2022-10-28 ENCOUNTER — OUTPATIENT (OUTPATIENT)
Dept: OUTPATIENT SERVICES | Facility: HOSPITAL | Age: 42
LOS: 1 days | End: 2022-10-28
Payer: SELF-PAY

## 2022-10-28 ENCOUNTER — APPOINTMENT (OUTPATIENT)
Dept: FAMILY MEDICINE | Facility: HOSPITAL | Age: 42
End: 2022-10-28

## 2022-10-28 VITALS
WEIGHT: 160 LBS | OXYGEN SATURATION: 100 % | HEART RATE: 78 BPM | BODY MASS INDEX: 29.26 KG/M2 | DIASTOLIC BLOOD PRESSURE: 77 MMHG | SYSTOLIC BLOOD PRESSURE: 130 MMHG | TEMPERATURE: 98.2 F | RESPIRATION RATE: 17 BRPM

## 2022-10-28 DIAGNOSIS — Z00.00 ENCOUNTER FOR GENERAL ADULT MEDICAL EXAMINATION WITHOUT ABNORMAL FINDINGS: ICD-10-CM

## 2022-10-28 DIAGNOSIS — K21.9 GASTRO-ESOPHAGEAL REFLUX DISEASE WITHOUT ESOPHAGITIS: ICD-10-CM

## 2022-10-28 PROCEDURE — G0463: CPT

## 2022-10-28 RX ORDER — ELECTROLYTES/DEXTROSE
SOLUTION, ORAL ORAL DAILY
Qty: 30 | Refills: 2 | Status: COMPLETED | COMMUNITY
Start: 2021-09-08 | End: 2022-10-28

## 2022-10-28 NOTE — REVIEW OF SYSTEMS
[Cough] : cough [Abdominal Pain] : abdominal pain [Heartburn] : heartburn [Negative] : Cardiovascular

## 2022-11-03 ENCOUNTER — RESULT CHARGE (OUTPATIENT)
Age: 42
End: 2022-11-03

## 2022-11-03 NOTE — ED ADULT TRIAGE NOTE - IDEAL BODY WEIGHT(KG)
Vascular Access Team    Ultrasound Guided PIV Insertion    A 20g X 1.75inch peripheral IV was inserted into the Left forearm in one needle pass using ultrasound guidance.  Brisk blood return noted and IV flushed easily with 10ml normal saline. Dressing dated and intact.  The patient tolerated the procedure well.  The bedside RNLilly was notified.     50

## 2022-11-04 ENCOUNTER — OUTPATIENT (OUTPATIENT)
Dept: OUTPATIENT SERVICES | Facility: HOSPITAL | Age: 42
LOS: 1 days | End: 2022-11-04
Payer: SELF-PAY

## 2022-11-04 ENCOUNTER — APPOINTMENT (OUTPATIENT)
Dept: CARDIOLOGY | Facility: HOSPITAL | Age: 42
End: 2022-11-04

## 2022-11-04 VITALS
RESPIRATION RATE: 16 BRPM | OXYGEN SATURATION: 99 % | HEIGHT: 62 IN | SYSTOLIC BLOOD PRESSURE: 133 MMHG | HEART RATE: 88 BPM | DIASTOLIC BLOOD PRESSURE: 80 MMHG | TEMPERATURE: 98.4 F

## 2022-11-04 DIAGNOSIS — Z00.00 ENCOUNTER FOR GENERAL ADULT MEDICAL EXAMINATION WITHOUT ABNORMAL FINDINGS: ICD-10-CM

## 2022-11-04 PROCEDURE — G0463: CPT

## 2022-11-04 PROCEDURE — 93010 ELECTROCARDIOGRAM REPORT: CPT

## 2022-11-04 PROCEDURE — 93005 ELECTROCARDIOGRAM TRACING: CPT

## 2022-11-05 ENCOUNTER — RX RENEWAL (OUTPATIENT)
Age: 42
End: 2022-11-05

## 2022-11-07 DIAGNOSIS — R94.39 ABNORMAL RESULT OF OTHER CARDIOVASCULAR FUNCTION STUDY: ICD-10-CM

## 2022-11-07 DIAGNOSIS — E78.5 HYPERLIPIDEMIA, UNSPECIFIED: ICD-10-CM

## 2022-11-10 ENCOUNTER — NON-APPOINTMENT (OUTPATIENT)
Age: 42
End: 2022-11-10

## 2022-11-21 ENCOUNTER — OUTPATIENT (OUTPATIENT)
Dept: OUTPATIENT SERVICES | Facility: HOSPITAL | Age: 42
LOS: 1 days | End: 2022-11-21
Payer: SELF-PAY

## 2022-11-21 ENCOUNTER — APPOINTMENT (OUTPATIENT)
Dept: FAMILY MEDICINE | Facility: HOSPITAL | Age: 42
End: 2022-11-21

## 2022-11-21 VITALS
SYSTOLIC BLOOD PRESSURE: 119 MMHG | RESPIRATION RATE: 16 BRPM | WEIGHT: 162 LBS | DIASTOLIC BLOOD PRESSURE: 79 MMHG | BODY MASS INDEX: 29.63 KG/M2 | HEART RATE: 95 BPM | OXYGEN SATURATION: 98 % | TEMPERATURE: 97 F

## 2022-11-21 DIAGNOSIS — I10 ESSENTIAL (PRIMARY) HYPERTENSION: ICD-10-CM

## 2022-11-21 DIAGNOSIS — N60.11 DIFFUSE CYSTIC MASTOPATHY OF RIGHT BREAST: ICD-10-CM

## 2022-11-21 DIAGNOSIS — Z86.69 PERSONAL HISTORY OF OTHER DISEASES OF THE NERVOUS SYSTEM AND SENSE ORGANS: ICD-10-CM

## 2022-11-21 DIAGNOSIS — E55.9 VITAMIN D DEFICIENCY, UNSPECIFIED: ICD-10-CM

## 2022-11-21 DIAGNOSIS — K21.9 GASTRO-ESOPHAGEAL REFLUX DISEASE WITHOUT ESOPHAGITIS: ICD-10-CM

## 2022-11-21 DIAGNOSIS — R94.39 ABNORMAL RESULT OF OTHER CARDIOVASCULAR FUNCTION STUDY: ICD-10-CM

## 2022-11-21 DIAGNOSIS — Z20.822 CONTACT WITH AND (SUSPECTED) EXPOSURE TO COVID-19: ICD-10-CM

## 2022-11-21 DIAGNOSIS — Z00.00 ENCOUNTER FOR GENERAL ADULT MEDICAL EXAMINATION WITHOUT ABNORMAL FINDINGS: ICD-10-CM

## 2022-11-21 DIAGNOSIS — Z87.898 PERSONAL HISTORY OF OTHER SPECIFIED CONDITIONS: ICD-10-CM

## 2022-11-21 PROCEDURE — 82306 VITAMIN D 25 HYDROXY: CPT

## 2022-11-21 PROCEDURE — G0463: CPT

## 2022-11-21 RX ORDER — CHOLECALCIFEROL (VITAMIN D3) 1250 MCG
1.25 MG CAPSULE ORAL
Qty: 8 | Refills: 0 | Status: DISCONTINUED | COMMUNITY
Start: 2022-09-12 | End: 2022-11-21

## 2022-11-22 LAB — 25(OH)D3 SERPL-MCNC: 42 NG/ML

## 2022-11-27 PROBLEM — E55.9 VITAMIN D DEFICIENCY: Status: ACTIVE | Noted: 2022-09-12

## 2022-11-27 PROBLEM — Z87.898 HISTORY OF POSTNASAL DRIP: Status: RESOLVED | Noted: 2022-10-28 | Resolved: 2022-11-27

## 2022-11-27 PROBLEM — Z20.822 ENCOUNTER FOR LABORATORY TESTING FOR COVID-19 VIRUS: Status: RESOLVED | Noted: 2022-09-22 | Resolved: 2022-11-27

## 2022-11-27 PROBLEM — Z86.69 HISTORY OF BLURRED VISION: Status: RESOLVED | Noted: 2022-08-18 | Resolved: 2022-11-27

## 2022-11-27 NOTE — HEALTH RISK ASSESSMENT
[Good] : ~his/her~  mood as  good [Never] : Never [No] : No [With Family] : lives with family [Employed] : employed [] :  [Sexually Active] : sexually active

## 2022-11-28 NOTE — HISTORY OF PRESENT ILLNESS
[FreeTextEntry1] : 41 y/o F presents for CPE  [de-identified] : 43 y/o F presents PMH HTN, GERD, HLD presents for CPE. Patient was last seen few weeks ago in cardio clinic for evaluation of episodic chest discomfort associated with dizziness and dyspnea on exertion. Patient has previously been evaluated by cardio at [facility] in 2020 and never received stress test results. At that time, stress test showed ST depressions in V4-V6 @ 6 min of stress, as well as TWI in III and AVF. Patient was given referral for TTE and stress echo, pt said has issues trying to get appt to get them done. She still gets intermittent chest discomfort, states not as frequent as before. Patient states has previous lung nodule found incidentally on imaging and never followed up with pulm, requesting pulm referral. Denies fever, chills,  SOB, N/V, abdominal pain, headache, cough, palpitations, leg pain, dizziness, weakness. Denies alcohol use, cigarette use, or recreational drug use. LMP 10/26/22

## 2022-11-28 NOTE — HISTORY OF PRESENT ILLNESS
[FreeTextEntry1] : 43 y/o F presents for CPE  [de-identified] : 41 y/o F presents PMH HTN, GERD, HLD presents for CPE. Patient was last seen few weeks ago in cardio clinic for evaluation of episodic chest discomfort associated with dizziness and dyspnea on exertion. Patient has previously been evaluated by cardio at [facility] in 2020 and never received stress test results. At that time, stress test showed ST depressions in V4-V6 @ 6 min of stress, as well as TWI in III and AVF. Patient was given referral for TTE and stress echo, pt said has issues trying to get appt to get them done. She still gets intermittent chest discomfort, states not as frequent as before. Patient states has previous lung nodule found incidentally on imaging and never followed up with pulm, requesting pulm referral. Denies fever, chills,  SOB, N/V, abdominal pain, headache, cough, palpitations, leg pain, dizziness, weakness. Denies alcohol use, cigarette use, or recreational drug use. LMP 10/26/22

## 2022-11-28 NOTE — REVIEW OF SYSTEMS
[Dyspnea on Exertion] : dyspnea on exertion [Fever] : no fever [Chills] : no chills [Fatigue] : no fatigue [Hot Flashes] : no hot flashes [Night Sweats] : no night sweats [Recent Change In Weight] : ~T no recent weight change [Discharge] : no discharge [Pain] : no pain [Redness] : no redness [Dryness] : no dryness  [Vision Problems] : no vision problems [Itching] : no itching [Earache] : no earache [Hearing Loss] : no hearing loss [Nosebleed] : no nosebleeds [Hoarseness] : no hoarseness [Nasal Discharge] : no nasal discharge [Sore Throat] : no sore throat [Postnasal Drip] : no postnasal drip [Palpitations] : no palpitations [Lower Ext Edema] : no lower extremity edema [Orthopnea] : no orthopnea [Paroxysmal Nocturnal Dyspnea] : no paroxysmal nocturnal dyspnea [Shortness Of Breath] : no shortness of breath [Wheezing] : no wheezing [Cough] : no cough [Abdominal Pain] : no abdominal pain [Nausea] : no nausea [Constipation] : no constipation [Diarrhea] : diarrhea [Vomiting] : no vomiting [Heartburn] : no heartburn [Melena] : no melena [Dysuria] : no dysuria [Incontinence] : no incontinence [Nocturia] : no nocturia [Poor Libido] : libido not poor [Hematuria] : no hematuria [Frequency] : no frequency [Vaginal Discharge] : no vaginal discharge [Dysmenorrhea] : no dysmenorrhea [Joint Pain] : no joint pain [Joint Stiffness] : no joint stiffness [Joint Swelling] : no joint swelling [Muscle Weakness] : no muscle weakness [Muscle Pain] : no muscle pain [Back Pain] : no back pain [Itching] : no itching [Skin Rash] : no skin rash [Headache] : no headache [Dizziness] : no dizziness [Fainting] : no fainting [FreeTextEntry5] : intermittent chest discomfort

## 2022-12-01 ENCOUNTER — OUTPATIENT (OUTPATIENT)
Dept: OUTPATIENT SERVICES | Facility: HOSPITAL | Age: 42
LOS: 1 days | End: 2022-12-01
Payer: SELF-PAY

## 2022-12-01 DIAGNOSIS — I51.89 OTHER ILL-DEFINED HEART DISEASES: ICD-10-CM

## 2022-12-01 DIAGNOSIS — R94.39 ABNORMAL RESULT OF OTHER CARDIOVASCULAR FUNCTION STUDY: ICD-10-CM

## 2022-12-01 PROCEDURE — 93306 TTE W/DOPPLER COMPLETE: CPT

## 2022-12-01 PROCEDURE — 93306 TTE W/DOPPLER COMPLETE: CPT | Mod: 26

## 2022-12-06 ENCOUNTER — APPOINTMENT (OUTPATIENT)
Dept: GASTROENTEROLOGY | Facility: HOSPITAL | Age: 42
End: 2022-12-06

## 2022-12-06 ENCOUNTER — OUTPATIENT (OUTPATIENT)
Dept: OUTPATIENT SERVICES | Facility: HOSPITAL | Age: 42
LOS: 1 days | End: 2022-12-06
Payer: SELF-PAY

## 2022-12-06 VITALS
RESPIRATION RATE: 16 BRPM | TEMPERATURE: 97 F | DIASTOLIC BLOOD PRESSURE: 74 MMHG | SYSTOLIC BLOOD PRESSURE: 119 MMHG | HEIGHT: 62 IN | BODY MASS INDEX: 29.81 KG/M2 | WEIGHT: 162 LBS | HEART RATE: 81 BPM

## 2022-12-06 DIAGNOSIS — Z78.9 OTHER SPECIFIED HEALTH STATUS: ICD-10-CM

## 2022-12-06 DIAGNOSIS — R10.9 UNSPECIFIED ABDOMINAL PAIN: ICD-10-CM

## 2022-12-06 DIAGNOSIS — R14.0 ABDOMINAL DISTENSION (GASEOUS): ICD-10-CM

## 2022-12-06 LAB
BASOPHILS # BLD AUTO: 0.04 K/UL — SIGNIFICANT CHANGE UP (ref 0–0.2)
BASOPHILS NFR BLD AUTO: 0.6 % — SIGNIFICANT CHANGE UP (ref 0–2)
EOSINOPHIL # BLD AUTO: 0.1 K/UL — SIGNIFICANT CHANGE UP (ref 0–0.5)
EOSINOPHIL NFR BLD AUTO: 1.5 % — SIGNIFICANT CHANGE UP (ref 0–6)
HCT VFR BLD CALC: 37.1 % — SIGNIFICANT CHANGE UP (ref 34.5–45)
HGB BLD-MCNC: 12.1 G/DL — SIGNIFICANT CHANGE UP (ref 11.5–15.5)
IMM GRANULOCYTES NFR BLD AUTO: 0.4 % — SIGNIFICANT CHANGE UP (ref 0–0.9)
LYMPHOCYTES # BLD AUTO: 1.85 K/UL — SIGNIFICANT CHANGE UP (ref 1–3.3)
LYMPHOCYTES # BLD AUTO: 27.6 % — SIGNIFICANT CHANGE UP (ref 13–44)
MCHC RBC-ENTMCNC: 27.7 PG — SIGNIFICANT CHANGE UP (ref 27–34)
MCHC RBC-ENTMCNC: 32.6 GM/DL — SIGNIFICANT CHANGE UP (ref 32–36)
MCV RBC AUTO: 84.9 FL — SIGNIFICANT CHANGE UP (ref 80–100)
MONOCYTES # BLD AUTO: 0.42 K/UL — SIGNIFICANT CHANGE UP (ref 0–0.9)
MONOCYTES NFR BLD AUTO: 6.3 % — SIGNIFICANT CHANGE UP (ref 2–14)
NEUTROPHILS # BLD AUTO: 4.27 K/UL — SIGNIFICANT CHANGE UP (ref 1.8–7.4)
NEUTROPHILS NFR BLD AUTO: 63.6 % — SIGNIFICANT CHANGE UP (ref 43–77)
PLATELET # BLD AUTO: 369 K/UL — SIGNIFICANT CHANGE UP (ref 150–400)
RBC # BLD: 4.37 M/UL — SIGNIFICANT CHANGE UP (ref 3.8–5.2)
RBC # FLD: 13.6 % — SIGNIFICANT CHANGE UP (ref 10.3–14.5)
WBC # BLD: 6.71 K/UL — SIGNIFICANT CHANGE UP (ref 3.8–10.5)
WBC # FLD AUTO: 6.71 K/UL — SIGNIFICANT CHANGE UP (ref 3.8–10.5)

## 2022-12-06 PROCEDURE — 82784 ASSAY IGA/IGD/IGG/IGM EACH: CPT

## 2022-12-06 PROCEDURE — 85025 COMPLETE CBC W/AUTO DIFF WBC: CPT

## 2022-12-06 PROCEDURE — 86364 TISS TRNSGLTMNASE EA IG CLAS: CPT

## 2022-12-06 PROCEDURE — ZZZZZ: CPT

## 2022-12-06 PROCEDURE — G0463: CPT

## 2022-12-06 PROCEDURE — 83520 IMMUNOASSAY QUANT NOS NONAB: CPT

## 2022-12-06 PROCEDURE — 83690 ASSAY OF LIPASE: CPT

## 2022-12-06 NOTE — PHYSICAL EXAM
[Normal] : alert, normal voice/communication, healthy appearing, no acute distress [Alert] : alert [Sclera] : the sclera and conjunctiva were normal [Normal Appearance] : the appearance of the neck was normal [No Respiratory Distress] : no respiratory distress [Heart Rate And Rhythm] : heart rate was normal and rhythm regular [Bowel Sounds] : normal bowel sounds [No Masses] : no abdominal mass palpated [Abdomen Soft] : soft [de-identified] : Mild epigastric ttp, no r/g

## 2022-12-06 NOTE — ASSESSMENT
[FreeTextEntry1] : 41yo F presents PMH of HTN, HLD, currently evaluated for chest pain who presents for evaluation of bloating.\par \par # Dyspepsia - no alarm symptoms. Longstanding. Partial response to short course of PPI. H.Pylori Ag previously negative (2021 - in the setting of similar symptoms). DDx includes functional dyspepsia vs. celiac, SIBO.\par # Chest pain - undergoing evaluation, pending stress TTE\par \par Recommendations:\par - c/w PPI for additional 4 weeks\par - CBC, lipase today\par - Lactulose breath test\par - Celiac serology\par - Discussed low FODMAP diet\par - If no relief with above, can consider TCA\par - RTC in 4 weeks\par \par Discussed with Dr. Kennedy.

## 2022-12-06 NOTE — HISTORY OF PRESENT ILLNESS
[FreeTextEntry1] : Ms. Concepcion is a 43yo F presents PMH of HTN, HLD, currently evaluated for chest pain who presents for evaluation of bloating.\par \par Patient reports longstanding abdominal discomfort and bloating that happens daily after every meal. Nothing makes it better or worse. No weight loss, dysphagia, odynophagia. No nausea or vomiting. No change in stool habits. No melena, hematochezia or hematemesis. No family history of colorectal cancer. Never had endoscopy or colonoscopy. Has been taking PPI for a month with minimal relief.

## 2022-12-07 DIAGNOSIS — Z78.9 OTHER SPECIFIED HEALTH STATUS: ICD-10-CM

## 2022-12-07 DIAGNOSIS — K31.9 DISEASE OF STOMACH AND DUODENUM, UNSPECIFIED: ICD-10-CM

## 2022-12-07 DIAGNOSIS — R14.0 ABDOMINAL DISTENSION (GASEOUS): ICD-10-CM

## 2022-12-07 LAB
IGA FLD-MCNC: 232 MG/DL — SIGNIFICANT CHANGE UP (ref 84–499)
LIDOCAIN IGE QN: 25 U/L — SIGNIFICANT CHANGE UP (ref 13–60)
TTG IGA SER-ACNC: <1.2 U/ML — SIGNIFICANT CHANGE UP
TTG IGA SER-ACNC: NEGATIVE — SIGNIFICANT CHANGE UP
TTG IGG SER IA-ACNC: NEGATIVE — SIGNIFICANT CHANGE UP
TTG IGG SER-ACNC: 4.3 U/ML — SIGNIFICANT CHANGE UP

## 2022-12-15 ENCOUNTER — OUTPATIENT (OUTPATIENT)
Dept: OUTPATIENT SERVICES | Facility: HOSPITAL | Age: 42
LOS: 1 days | End: 2022-12-15
Payer: SELF-PAY

## 2022-12-15 DIAGNOSIS — R07.89 OTHER CHEST PAIN: ICD-10-CM

## 2022-12-15 DIAGNOSIS — R94.39 ABNORMAL RESULT OF OTHER CARDIOVASCULAR FUNCTION STUDY: ICD-10-CM

## 2022-12-15 PROCEDURE — 93016 CV STRESS TEST SUPVJ ONLY: CPT

## 2022-12-15 PROCEDURE — 93018 CV STRESS TEST I&R ONLY: CPT

## 2022-12-15 PROCEDURE — 93017 CV STRESS TEST TRACING ONLY: CPT

## 2023-01-15 NOTE — HISTORY OF PRESENT ILLNESS
[FreeTextEntry1] : FU  [de-identified] : 43 yo F presents for FU after being seen 1 month ago for abd bloating and acid reflux. Was told to abstain from  dairy products and changed from Pepcid to Protonix, reports some improvement in sxs. Reports mild cough and throat sensation when wakes up in the morning.

## 2023-01-24 ENCOUNTER — APPOINTMENT (OUTPATIENT)
Age: 43
End: 2023-01-24

## 2023-01-25 ENCOUNTER — APPOINTMENT (OUTPATIENT)
Dept: PULMONOLOGY | Facility: CLINIC | Age: 43
End: 2023-01-25
Payer: COMMERCIAL

## 2023-01-25 VITALS
WEIGHT: 158 LBS | HEART RATE: 77 BPM | SYSTOLIC BLOOD PRESSURE: 147 MMHG | TEMPERATURE: 98 F | OXYGEN SATURATION: 99 % | HEIGHT: 62 IN | DIASTOLIC BLOOD PRESSURE: 78 MMHG | BODY MASS INDEX: 29.08 KG/M2 | RESPIRATION RATE: 15 BRPM

## 2023-01-25 VITALS
DIASTOLIC BLOOD PRESSURE: 80 MMHG | HEIGHT: 62 IN | HEART RATE: 82 BPM | TEMPERATURE: 98.7 F | BODY MASS INDEX: 28.71 KG/M2 | SYSTOLIC BLOOD PRESSURE: 140 MMHG | WEIGHT: 156 LBS

## 2023-01-25 DIAGNOSIS — R06.02 SHORTNESS OF BREATH: ICD-10-CM

## 2023-01-25 DIAGNOSIS — R91.1 SOLITARY PULMONARY NODULE: ICD-10-CM

## 2023-01-25 PROCEDURE — 99203 OFFICE O/P NEW LOW 30 MIN: CPT | Mod: 25

## 2023-01-25 PROCEDURE — 94010 BREATHING CAPACITY TEST: CPT

## 2023-01-25 PROCEDURE — 94726 PLETHYSMOGRAPHY LUNG VOLUMES: CPT

## 2023-01-25 PROCEDURE — ZZZZZ: CPT

## 2023-01-25 PROCEDURE — 94729 DIFFUSING CAPACITY: CPT

## 2023-01-25 NOTE — ASSESSMENT
[FreeTextEntry1] : 42 F pmhx HTN, HLD, calcified nodule presenting with shortness of breath and phlegm\par Likely 2/2 anxiety and post nasal drip\par PFT reviewed, some mild obstruction is in the setting of decreased effort result during spirometry. \par \par Plan\par - Trial flonase for post nasal drip\par - Discussed albuterol inhaler for sob though counselled patient on anxiety likely causing breathing pattern and sensation. After discussion plan to focus on phlegm in back of the throat and hold off on inhaler for now. \par - Urged to have close cardiac follow up, patient agrees and will do so. \par - Continue with claritin as already prescribed.

## 2023-01-25 NOTE — HISTORY OF PRESENT ILLNESS
[Never] : never [TextBox_4] : PI: [id number]\par \par 41 yo F pmhx HTN, GERD, HLD seen in past for pulmonary nodule  who presents for follow up for shortness of breath. \par \par Pt seen by pulmonary in the office in the past (June 2020) and was likely from old scarring from previous infection. She was recommended at that time to get PFTs closer to her home but never followed up because of COVID. States she is having some discomfort when she is breathing. When she is taking a deep breath she feels like the air gets stuck in her chest. She states she has to take this deep breath out of nowhere and will feel tired. If she is doing any activity she needs to stop because she wants to take a deep breath or she gets a choking sensation. No cough during the day but in the middle of the night she will cough. This happens around 1-2x a week. She does get phlegm in the back of her throat but cannot clear it. Has had these symptoms for the last 18 months and they have been stable. \par \par Pt complains of some chest pain at baseline. A stress test showed ST depressions in V4-V6 @ 6 min of stress, as well as TWI in III and AVF. Patient was given referral for TTE and stress echo\par \par Her 2 aunts had a history of TB.\par She works in a grocery store

## 2023-01-25 NOTE — REVIEW OF SYSTEMS
[Cough] : cough [Sputum] : sputum [Fever] : no fever [Chills] : no chills [Dry Eyes] : no dry eyes [Eye Irritation] : no eye irritation [Sinus Problems] : no sinus problems

## 2023-02-07 ENCOUNTER — OUTPATIENT (OUTPATIENT)
Dept: OUTPATIENT SERVICES | Facility: HOSPITAL | Age: 43
LOS: 1 days | End: 2023-02-07
Payer: SELF-PAY

## 2023-02-07 ENCOUNTER — APPOINTMENT (OUTPATIENT)
Dept: GASTROENTEROLOGY | Facility: HOSPITAL | Age: 43
End: 2023-02-07
Payer: COMMERCIAL

## 2023-02-07 VITALS
RESPIRATION RATE: 18 BRPM | HEART RATE: 76 BPM | SYSTOLIC BLOOD PRESSURE: 156 MMHG | HEIGHT: 62 IN | TEMPERATURE: 98.2 F | BODY MASS INDEX: 29.08 KG/M2 | WEIGHT: 158 LBS | DIASTOLIC BLOOD PRESSURE: 77 MMHG

## 2023-02-07 DIAGNOSIS — F45.8 OTHER SOMATOFORM DISORDERS: ICD-10-CM

## 2023-02-07 DIAGNOSIS — E66.3 OVERWEIGHT: ICD-10-CM

## 2023-02-07 DIAGNOSIS — Z87.19 PERSONAL HISTORY OF OTHER DISEASES OF THE DIGESTIVE SYSTEM: ICD-10-CM

## 2023-02-07 DIAGNOSIS — Z83.79 FAMILY HISTORY OF OTHER DISEASES OF THE DIGESTIVE SYSTEM: ICD-10-CM

## 2023-02-07 DIAGNOSIS — R10.9 UNSPECIFIED ABDOMINAL PAIN: ICD-10-CM

## 2023-02-07 DIAGNOSIS — R10.13 EPIGASTRIC PAIN: ICD-10-CM

## 2023-02-07 PROCEDURE — ZZZZZ: CPT | Mod: GC

## 2023-02-07 PROCEDURE — G0463: CPT

## 2023-02-07 NOTE — END OF VISIT
[] : Fellow [FreeTextEntry3] : As modified and discussed with patient\par MD HERNÁN Bronson [facility]\par Associate Professor of Medicine\par [facility] [employer]\par

## 2023-02-07 NOTE — HISTORY OF PRESENT ILLNESS
[FreeTextEntry1] : Ms. Concepcion is a 43yo F presents PMH of HTN, HLD, currently evaluated for chest pain who presents for evaluation of bloating.\par \par Patient reports longstanding abdominal discomfort and bloating that happens daily after every meal. Nothing makes it better or worse. She denies weight loss, dysphagia, odynophagia.nausea or vomiting, change in stool habits,  melena, hematochezia or hematemesis.   There is no family history of colorectal cancer. Never had endoscopy or colonoscopy. Has been taking PPI for 2 months with minimal relief. Patient reports eating rapidly. Not chewing gum or smoking. No heartburn. No reflux symptoms.\par \par \par \par

## 2023-02-07 NOTE — ASSESSMENT
[FreeTextEntry1] : 41yo F presents PMH of HTN, HLD, currently evaluated for chest pain who presents for evaluation of bloating.\par \par # Dyspepsia - no alarm symptoms. Longstanding. No response to PPI. H.Pylori Ag previously negative (2021 - in the setting of similar symptoms). DDx includes functional dyspepsia vs. celiac, SIBO. Reports rapid chewing and eating - aerophagia? Discussed trial of TCA. Patient prefers to avoid TCA at this time and try simethicone and slower eating. \par # Chest pain - undergoing evaluation, pending stress TTE\par \par Recommendations:\par - Lactulose breath test\par - RUQ US\par - Celiac serology\par - Discussed low FODMAP diet\par - Simethicone PRN\par - Weight loss of 20 lbs.\par - COVID booster dose\par - RTC in 3 months

## 2023-02-07 NOTE — PHYSICAL EXAM
[Normal] : alert, normal voice/communication, healthy appearing, no acute distress [Alert] : alert [Sclera] : the sclera and conjunctiva were normal [Normal Appearance] : the appearance of the neck was normal [No Respiratory Distress] : no respiratory distress [Heart Rate And Rhythm] : heart rate was normal and rhythm regular [Bowel Sounds] : normal bowel sounds [No Masses] : no abdominal mass palpated [Abdomen Soft] : soft [de-identified] : Mild epigastric ttp, no r/g

## 2023-02-13 ENCOUNTER — NON-APPOINTMENT (OUTPATIENT)
Age: 43
End: 2023-02-13

## 2023-02-13 ENCOUNTER — APPOINTMENT (OUTPATIENT)
Dept: OPHTHALMOLOGY | Facility: CLINIC | Age: 43
End: 2023-02-13
Payer: COMMERCIAL

## 2023-02-13 PROCEDURE — 92004 COMPRE OPH EXAM NEW PT 1/>: CPT

## 2023-02-13 PROCEDURE — 92285 EXTERNAL OCULAR PHOTOGRAPHY: CPT

## 2023-02-16 ENCOUNTER — OUTPATIENT (OUTPATIENT)
Dept: OUTPATIENT SERVICES | Facility: HOSPITAL | Age: 43
LOS: 1 days | End: 2023-02-16
Payer: SELF-PAY

## 2023-02-16 ENCOUNTER — APPOINTMENT (OUTPATIENT)
Dept: ULTRASOUND IMAGING | Facility: HOSPITAL | Age: 43
End: 2023-02-16
Payer: COMMERCIAL

## 2023-02-16 DIAGNOSIS — R10.13 EPIGASTRIC PAIN: ICD-10-CM

## 2023-02-16 PROCEDURE — 76705 ECHO EXAM OF ABDOMEN: CPT

## 2023-02-16 PROCEDURE — 76705 ECHO EXAM OF ABDOMEN: CPT | Mod: 26

## 2023-02-24 ENCOUNTER — APPOINTMENT (OUTPATIENT)
Dept: FAMILY MEDICINE | Facility: HOSPITAL | Age: 43
End: 2023-02-24

## 2023-02-24 ENCOUNTER — OUTPATIENT (OUTPATIENT)
Dept: OUTPATIENT SERVICES | Facility: HOSPITAL | Age: 43
LOS: 1 days | End: 2023-02-24
Payer: SELF-PAY

## 2023-02-24 VITALS
OXYGEN SATURATION: 97 % | WEIGHT: 158 LBS | SYSTOLIC BLOOD PRESSURE: 127 MMHG | RESPIRATION RATE: 18 BRPM | HEIGHT: 62 IN | DIASTOLIC BLOOD PRESSURE: 78 MMHG | HEART RATE: 84 BPM | BODY MASS INDEX: 29.08 KG/M2 | TEMPERATURE: 98.6 F

## 2023-02-24 DIAGNOSIS — R94.39 ABNORMAL RESULT OF OTHER CARDIOVASCULAR FUNCTION STUDY: ICD-10-CM

## 2023-02-24 DIAGNOSIS — Z86.79 PERSONAL HISTORY OF OTHER DISEASES OF THE CIRCULATORY SYSTEM: ICD-10-CM

## 2023-02-24 DIAGNOSIS — Z00.00 ENCOUNTER FOR GENERAL ADULT MEDICAL EXAMINATION WITHOUT ABNORMAL FINDINGS: ICD-10-CM

## 2023-02-24 DIAGNOSIS — K59.00 CONSTIPATION, UNSPECIFIED: ICD-10-CM

## 2023-02-24 PROCEDURE — G0463: CPT

## 2023-02-26 PROBLEM — K59.00 CONSTIPATION, UNSPECIFIED CONSTIPATION TYPE: Status: ACTIVE | Noted: 2021-12-16

## 2023-02-26 PROBLEM — R94.39 ABNORMAL STRESS TEST: Status: RESOLVED | Noted: 2022-09-24 | Resolved: 2023-02-26

## 2023-02-26 PROBLEM — Z86.79 HISTORY OF DIASTOLIC DYSFUNCTION: Status: RESOLVED | Noted: 2022-09-24 | Resolved: 2023-02-26

## 2023-02-26 RX ORDER — LORATADINE 10 MG/1
10 TABLET ORAL
Qty: 1 | Refills: 0 | Status: DISCONTINUED | COMMUNITY
Start: 2022-10-28 | End: 2023-02-26

## 2023-02-26 RX ORDER — FLUTICASONE PROPIONATE 50 UG/1
50 SPRAY, METERED NASAL TWICE DAILY
Qty: 1 | Refills: 0 | Status: DISCONTINUED | COMMUNITY
Start: 2023-01-25 | End: 2023-02-26

## 2023-02-26 NOTE — HISTORY OF PRESENT ILLNESS
[FreeTextEntry1] : 43 y/o F presents for BP check  [de-identified] : 41 y/o F presents PMH HTN, GERD, HLD presents for BP check. Pt last seen 11/2022 for CPE. Patient taking lisinopril 20 mg for BP, taking medication as prescribed. Was seen in cardio clinic 11/4/22 evaluation of episodic chest discomfort. Patient has previously been evaluated by cardio at [facility] in 2020 and never received stress test results. At that time, stress test showed ST depressions in V4-V6 @ 6 min of stress, as well as TWI in III and AVF. Patient was given referral for TTE and stress echo, results reviewed in HIE WNL. Patient saw GI for continued GERD and abdominal bloating, was told to continue PPI and prescribed simethicone but did not receive simethicone. Pt states some does she does not have bowel movement. Denies fever, chills, SOB, N/V, abdominal pain, headache, cough, palpitations, leg pain, dizziness, weakness.\par \par BP in clinic 127/78

## 2023-02-28 DIAGNOSIS — R07.89 OTHER CHEST PAIN: ICD-10-CM

## 2023-02-28 DIAGNOSIS — I10 ESSENTIAL (PRIMARY) HYPERTENSION: ICD-10-CM

## 2023-02-28 DIAGNOSIS — R10.13 EPIGASTRIC PAIN: ICD-10-CM

## 2023-04-28 RX ORDER — METOPROLOL TARTRATE 50 MG/1
50 TABLET, FILM COATED ORAL
Qty: 60 | Refills: 2 | Status: ACTIVE | COMMUNITY
Start: 1900-01-01 | End: 1900-01-01

## 2023-05-02 ENCOUNTER — OUTPATIENT (OUTPATIENT)
Dept: OUTPATIENT SERVICES | Facility: HOSPITAL | Age: 43
LOS: 1 days | End: 2023-05-02
Payer: SELF-PAY

## 2023-05-02 ENCOUNTER — APPOINTMENT (OUTPATIENT)
Dept: GASTROENTEROLOGY | Facility: HOSPITAL | Age: 43
End: 2023-05-02
Payer: COMMERCIAL

## 2023-05-02 VITALS
HEIGHT: 62 IN | DIASTOLIC BLOOD PRESSURE: 92 MMHG | BODY MASS INDEX: 29.08 KG/M2 | SYSTOLIC BLOOD PRESSURE: 149 MMHG | WEIGHT: 158 LBS | TEMPERATURE: 98 F | HEART RATE: 73 BPM | RESPIRATION RATE: 14 BRPM

## 2023-05-02 DIAGNOSIS — R10.9 UNSPECIFIED ABDOMINAL PAIN: ICD-10-CM

## 2023-05-02 PROCEDURE — 86364 TISS TRNSGLTMNASE EA IG CLAS: CPT

## 2023-05-02 PROCEDURE — 82784 ASSAY IGA/IGD/IGG/IGM EACH: CPT

## 2023-05-02 PROCEDURE — G0463: CPT

## 2023-05-02 PROCEDURE — ZZZZZ: CPT

## 2023-05-02 PROCEDURE — 85025 COMPLETE CBC W/AUTO DIFF WBC: CPT

## 2023-05-02 NOTE — PHYSICAL EXAM
[Normal] : alert, normal voice/communication, healthy appearing, no acute distress [Alert] : alert [Sclera] : the sclera and conjunctiva were normal [Normal Appearance] : the appearance of the neck was normal [No Respiratory Distress] : no respiratory distress [Heart Rate And Rhythm] : heart rate was normal and rhythm regular [Bowel Sounds] : normal bowel sounds [No Masses] : no abdominal mass palpated [Abdomen Soft] : soft [de-identified] : Mild epigastric ttp, no r/g

## 2023-05-02 NOTE — ASSESSMENT
[FreeTextEntry1] : 43yo F presents PMH of HTN, HLD, currently evaluated for chest pain who presents for evaluation of bloating.\par \par # Dyspepsia - no alarm symptoms. Longstanding. No response to PPI. H.Pylori Ag previously negative (2021 - in the setting of similar symptoms). DDx includes functional dyspepsia vs. celiac, SIBO. Reports rapid chewing and eating - aerophagia? Discussed trial of TCA. Patient prefers to avoid TCA at this time and try simethicone and slower eating. \par # Chest pain - undergoing evaluation, pending stress TTE\par # Fatty liver - normal CMP. Will get fibroscan\par \par Recommendations:\par - Lactulose breath test\par - Fibroscan\par - f/u with cardiology\par - Celiac serology\par - Discussed low FODMAP diet\par - Simethicone PRN\par - Weight loss of 20 lbs.\par - RTC in 3 months.

## 2023-05-02 NOTE — HISTORY OF PRESENT ILLNESS
[FreeTextEntry1] : Ms. Concepcion is a 41yo F presents PMH of HTN, HLD, currently evaluated for chest pain who presents for evaluation of bloating.\par \par Patient reports longstanding abdominal discomfort and bloating that happens daily after every meal. Nothing makes it better or worse. She denies weight loss, dysphagia, odynophagia.nausea or vomiting, change in stool habits, melena, hematochezia or hematemesis. There is no family history of colorectal cancer. Never had endoscopy or colonoscopy. Has been taking PPI for 2 months with minimal relief. Patient reports eating rapidly. Not chewing gum or smoking. No heartburn. No reflux symptoms.\par \par Was previously recommended breath test and celiac testing which was not completed. Did not followup with cardiology. Continues to complain of post prandial bloating and excessive belching. No reflux. No heartburn.\par \par RUQ with fatty liver. Discussed weight loss. \par

## 2023-05-03 LAB
BASOPHILS # BLD AUTO: 0.03 K/UL — SIGNIFICANT CHANGE UP (ref 0–0.2)
BASOPHILS NFR BLD AUTO: 0.4 % — SIGNIFICANT CHANGE UP (ref 0–2)
EOSINOPHIL # BLD AUTO: 0.08 K/UL — SIGNIFICANT CHANGE UP (ref 0–0.5)
EOSINOPHIL NFR BLD AUTO: 1 % — SIGNIFICANT CHANGE UP (ref 0–6)
HCT VFR BLD CALC: 39.2 % — SIGNIFICANT CHANGE UP (ref 34.5–45)
HGB BLD-MCNC: 12.1 G/DL — SIGNIFICANT CHANGE UP (ref 11.5–15.5)
IGA FLD-MCNC: 228 MG/DL — SIGNIFICANT CHANGE UP (ref 84–499)
IMM GRANULOCYTES NFR BLD AUTO: 0.3 % — SIGNIFICANT CHANGE UP (ref 0–0.9)
LYMPHOCYTES # BLD AUTO: 1.74 K/UL — SIGNIFICANT CHANGE UP (ref 1–3.3)
LYMPHOCYTES # BLD AUTO: 22.1 % — SIGNIFICANT CHANGE UP (ref 13–44)
MCHC RBC-ENTMCNC: 26.5 PG — LOW (ref 27–34)
MCHC RBC-ENTMCNC: 30.9 GM/DL — LOW (ref 32–36)
MCV RBC AUTO: 85.8 FL — SIGNIFICANT CHANGE UP (ref 80–100)
MONOCYTES # BLD AUTO: 0.48 K/UL — SIGNIFICANT CHANGE UP (ref 0–0.9)
MONOCYTES NFR BLD AUTO: 6.1 % — SIGNIFICANT CHANGE UP (ref 2–14)
NEUTROPHILS # BLD AUTO: 5.51 K/UL — SIGNIFICANT CHANGE UP (ref 1.8–7.4)
NEUTROPHILS NFR BLD AUTO: 70.1 % — SIGNIFICANT CHANGE UP (ref 43–77)
PLATELET # BLD AUTO: 302 K/UL — SIGNIFICANT CHANGE UP (ref 150–400)
RBC # BLD: 4.57 M/UL — SIGNIFICANT CHANGE UP (ref 3.8–5.2)
RBC # FLD: 14.2 % — SIGNIFICANT CHANGE UP (ref 10.3–14.5)
TTG IGA SER-ACNC: <1.2 U/ML — SIGNIFICANT CHANGE UP
TTG IGA SER-ACNC: NEGATIVE — SIGNIFICANT CHANGE UP
WBC # BLD: 7.86 K/UL — SIGNIFICANT CHANGE UP (ref 3.8–10.5)
WBC # FLD AUTO: 7.86 K/UL — SIGNIFICANT CHANGE UP (ref 3.8–10.5)

## 2023-05-04 DIAGNOSIS — R10.13 EPIGASTRIC PAIN: ICD-10-CM

## 2023-05-04 DIAGNOSIS — K31.9 DISEASE OF STOMACH AND DUODENUM, UNSPECIFIED: ICD-10-CM

## 2023-05-04 DIAGNOSIS — K76.0 FATTY (CHANGE OF) LIVER, NOT ELSEWHERE CLASSIFIED: ICD-10-CM

## 2023-05-15 ENCOUNTER — APPOINTMENT (OUTPATIENT)
Dept: HEPATOLOGY | Facility: CLINIC | Age: 43
End: 2023-05-15
Payer: SELF-PAY

## 2023-05-15 DIAGNOSIS — K76.0 FATTY (CHANGE OF) LIVER, NOT ELSEWHERE CLASSIFIED: ICD-10-CM

## 2023-05-15 PROCEDURE — 76981 USE PARENCHYMA: CPT

## 2023-05-31 ENCOUNTER — OUTPATIENT (OUTPATIENT)
Dept: OUTPATIENT SERVICES | Facility: HOSPITAL | Age: 43
LOS: 1 days | End: 2023-05-31
Payer: SELF-PAY

## 2023-05-31 ENCOUNTER — OUTPATIENT (OUTPATIENT)
Dept: OUTPATIENT SERVICES | Facility: HOSPITAL | Age: 43
LOS: 1 days | End: 2023-05-31
Payer: COMMERCIAL

## 2023-05-31 ENCOUNTER — APPOINTMENT (OUTPATIENT)
Dept: FAMILY MEDICINE | Facility: HOSPITAL | Age: 43
End: 2023-05-31

## 2023-05-31 VITALS
RESPIRATION RATE: 14 BRPM | HEART RATE: 71 BPM | OXYGEN SATURATION: 99 % | WEIGHT: 159 LBS | DIASTOLIC BLOOD PRESSURE: 79 MMHG | BODY MASS INDEX: 29.08 KG/M2 | TEMPERATURE: 98.1 F | SYSTOLIC BLOOD PRESSURE: 125 MMHG

## 2023-05-31 DIAGNOSIS — Z12.11 ENCOUNTER FOR SCREENING FOR MALIGNANT NEOPLASM OF COLON: ICD-10-CM

## 2023-05-31 DIAGNOSIS — R01.1 CARDIAC MURMUR, UNSPECIFIED: ICD-10-CM

## 2023-05-31 DIAGNOSIS — Z00.00 ENCOUNTER FOR GENERAL ADULT MEDICAL EXAMINATION WITHOUT ABNORMAL FINDINGS: ICD-10-CM

## 2023-05-31 PROCEDURE — G0463: CPT

## 2023-05-31 NOTE — HISTORY OF PRESENT ILLNESS
[FreeTextEntry1] : dyspepsia, chest pain [de-identified] : 42F with PMHx dyspepsia, HLD, fatty liver, systolic murmur presents for f/u chest pain and dyspepsia. pt saw GI a few weeks ago and was sent for testing which patient has done. pt states she is waiting for results. dyspepsia symptoms have not improved though pt is trying to make f/u appointment with GI. pt states she has not seen cardio since her stress test which was negative. pt is amenable to seeing cardio for f/u. pt states she gets intermittent chest pains at rest and does not notice anything that aggravates the symptoms. pt states she has no chest pain at this time. denies palpitations, shortness of breath.  [FreeTextEntry8] : csp

## 2023-05-31 NOTE — HISTORY OF PRESENT ILLNESS
[FreeTextEntry1] : dyspepsia, chest pain [de-identified] : 42F with PMHx dyspepsia, HLD, fatty liver, systolic murmur presents for f/u chest pain and dyspepsia. pt saw GI a few weeks ago and was sent for testing which patient has done. pt states she is waiting for results. dyspepsia symptoms have not improved though pt is trying to make f/u appointment with GI. pt states she has not seen cardio since her stress test which was negative. pt is amenable to seeing cardio for f/u. pt states she gets intermittent chest pains at rest and does not notice anything that aggravates the symptoms. pt states she has no chest pain at this time. denies palpitations, shortness of breath.  [FreeTextEntry8] : csp

## 2023-05-31 NOTE — PHYSICAL EXAM
[Normal] : no joint swelling and grossly normal strength and tone [de-identified] : +systolic murmur

## 2023-05-31 NOTE — PHYSICAL EXAM
[Normal] : no joint swelling and grossly normal strength and tone [de-identified] : +systolic murmur

## 2023-05-31 NOTE — ASSESSMENT
[FreeTextEntry1] : 42F here for\par \par #csp\par -mammo done today\par -pap smear UTD\par \par Case discussed with Dr King

## 2023-06-01 DIAGNOSIS — R10.13 EPIGASTRIC PAIN: ICD-10-CM

## 2023-06-01 DIAGNOSIS — Z29.9 ENCOUNTER FOR PROPHYLACTIC MEASURES, UNSPECIFIED: ICD-10-CM

## 2023-06-01 DIAGNOSIS — R01.1 CARDIAC MURMUR, UNSPECIFIED: ICD-10-CM

## 2023-06-06 ENCOUNTER — OUTPATIENT (OUTPATIENT)
Dept: OUTPATIENT SERVICES | Facility: HOSPITAL | Age: 43
LOS: 1 days | End: 2023-06-06

## 2023-06-06 DIAGNOSIS — Z29.9 ENCOUNTER FOR PROPHYLACTIC MEASURES, UNSPECIFIED: ICD-10-CM

## 2023-06-06 PROCEDURE — 83036 HEMOGLOBIN GLYCOSYLATED A1C: CPT

## 2023-06-06 PROCEDURE — 85027 COMPLETE CBC AUTOMATED: CPT

## 2023-06-06 PROCEDURE — 80061 LIPID PANEL: CPT

## 2023-06-06 PROCEDURE — 80053 COMPREHEN METABOLIC PANEL: CPT

## 2023-06-06 PROCEDURE — G0463: CPT

## 2023-06-13 ENCOUNTER — RESULT REVIEW (OUTPATIENT)
Age: 43
End: 2023-06-13

## 2023-06-13 ENCOUNTER — APPOINTMENT (OUTPATIENT)
Dept: FAMILY MEDICINE | Facility: HOSPITAL | Age: 43
End: 2023-06-13

## 2023-06-13 LAB
ALBUMIN SERPL ELPH-MCNC: 4.6 G/DL
ALP BLD-CCNC: 70 U/L
ALT SERPL-CCNC: 16 U/L
ANION GAP SERPL CALC-SCNC: 14 MMOL/L
AST SERPL-CCNC: 12 U/L
BILIRUB SERPL-MCNC: 0.2 MG/DL
BUN SERPL-MCNC: 10 MG/DL
CALCIUM SERPL-MCNC: 9 MG/DL
CHLORIDE SERPL-SCNC: 106 MMOL/L
CHOLEST SERPL-MCNC: 186 MG/DL
CO2 SERPL-SCNC: 23 MMOL/L
CREAT SERPL-MCNC: 0.54 MG/DL
EGFR: 119 ML/MIN/1.73M2
ESTIMATED AVERAGE GLUCOSE: 117 MG/DL
GLUCOSE SERPL-MCNC: 87 MG/DL
HBA1C MFR BLD HPLC: 5.7 %
HCT VFR BLD CALC: 37.5 %
HDLC SERPL-MCNC: 38 MG/DL
HGB BLD-MCNC: 11.9 G/DL
LDLC SERPL CALC-MCNC: 123 MG/DL
MCHC RBC-ENTMCNC: 27.2 PG
MCHC RBC-ENTMCNC: 31.7 GM/DL
MCV RBC AUTO: 85.6 FL
NONHDLC SERPL-MCNC: 148 MG/DL
PLATELET # BLD AUTO: 323 K/UL
POTASSIUM SERPL-SCNC: 4.2 MMOL/L
PROT SERPL-MCNC: 7.1 G/DL
RBC # BLD: 4.38 M/UL
RBC # FLD: 13.7 %
SODIUM SERPL-SCNC: 143 MMOL/L
TRIGL SERPL-MCNC: 126 MG/DL
WBC # FLD AUTO: 5.38 K/UL

## 2023-06-23 NOTE — HISTORY OF PRESENT ILLNESS
[FreeTextEntry1] : Amenorrhea follow up / med refill [de-identified] : 40 y/o F PMH GERD, HTN presents for amenorrhea follow up and HTN follow up.  Was seen approx 1 month ago after w/u for amenorrhea. Found to have low FSH, Low LH and prescribed tri-lo-sprintec for hypothalamic hypogonadism. Pt states that she had her period on 1/24/22 and lasted 3 days. She did not start her OCP's because her period started 1 day prior to her planned start. Otherwise, pt is feeling well today\par

## 2023-06-23 NOTE — PLAN
[FreeTextEntry1] : 40 y/o F PMH GERD, HTN presents with c/o of upper abdominal pain \par \par #Epigastric discomfort \par -having sxs for about 2 weeks\par -hx of GERD on omeprazole 40 mg, stopped taking 1 week ago b/c no relief\par -txt for H pylori in past, Repeat on Aug 2021 negative\par -will start famotidine 20 mg and sucralfate 1 tab 4 times a day\par -recommend losing weight\par -discussed avoid fatty foods, caffeinated products, chocolate, spicy foods, etc\par \par #HTN\par - BP today 130/78\par - renewed Lisonopril 20mg \par \par RTC in 2 weeks for f/u abdominal pain\par \par Case discussed with Dr. King\par

## 2023-06-24 ENCOUNTER — APPOINTMENT (OUTPATIENT)
Dept: FAMILY MEDICINE | Facility: HOSPITAL | Age: 43
End: 2023-06-24

## 2023-06-24 ENCOUNTER — OUTPATIENT (OUTPATIENT)
Dept: OUTPATIENT SERVICES | Facility: HOSPITAL | Age: 43
LOS: 1 days | End: 2023-06-24
Payer: SELF-PAY

## 2023-06-24 VITALS
WEIGHT: 160 LBS | SYSTOLIC BLOOD PRESSURE: 130 MMHG | DIASTOLIC BLOOD PRESSURE: 82 MMHG | TEMPERATURE: 98 F | RESPIRATION RATE: 14 BRPM | BODY MASS INDEX: 29.26 KG/M2 | OXYGEN SATURATION: 100 % | HEART RATE: 76 BPM

## 2023-06-24 DIAGNOSIS — Z00.00 ENCOUNTER FOR GENERAL ADULT MEDICAL EXAMINATION WITHOUT ABNORMAL FINDINGS: ICD-10-CM

## 2023-06-24 PROCEDURE — G0463: CPT

## 2023-06-24 RX ORDER — POLYETHYLENE GLYCOL 3350 17 G/17G
17 POWDER, FOR SOLUTION ORAL DAILY
Qty: 20 | Refills: 0 | Status: DISCONTINUED | COMMUNITY
Start: 2023-02-24 | End: 2023-06-24

## 2023-06-25 NOTE — HISTORY OF PRESENT ILLNESS
[FreeTextEntry1] : 43 F presents for f/u  [de-identified] : 43 F with PMH  dyspepsia, HLD, fatty liver, prediabetes. presents for f/u of dyspepsia. Pt seen 1 month ago for longstanding abdominal distension and bloating. Pt states every time she eats feels bloated and belches. Pt denies N/V, weight loss, dysphagia, constipation diarrhea. She has tried PPI for long time with no response. H pylori test negative. Pt saw GI in May. she  was sent for testing including lactulose breath. celiac serology and fibroscan. Celiac serology was negative, fibroscan with no fibrosis. Pt did menton to GI rapid chewing and eating, was told to try to eat smaller meals and eat slowly. Pt was offered TCA but declined. Pt was also prescribed simethicone PRN but did not received medication

## 2023-06-26 DIAGNOSIS — R10.13 EPIGASTRIC PAIN: ICD-10-CM

## 2023-06-30 ENCOUNTER — RESULT REVIEW (OUTPATIENT)
Age: 43
End: 2023-06-30

## 2023-06-30 ENCOUNTER — APPOINTMENT (OUTPATIENT)
Dept: MAMMOGRAPHY | Facility: HOSPITAL | Age: 43
End: 2023-06-30
Payer: COMMERCIAL

## 2023-06-30 ENCOUNTER — OUTPATIENT (OUTPATIENT)
Dept: OUTPATIENT SERVICES | Facility: HOSPITAL | Age: 43
LOS: 1 days | End: 2023-06-30
Payer: COMMERCIAL

## 2023-06-30 DIAGNOSIS — Z00.8 ENCOUNTER FOR OTHER GENERAL EXAMINATION: ICD-10-CM

## 2023-06-30 PROCEDURE — 77067 SCR MAMMO BI INCL CAD: CPT

## 2023-06-30 PROCEDURE — 77067 SCR MAMMO BI INCL CAD: CPT | Mod: 26

## 2023-06-30 PROCEDURE — 77063 BREAST TOMOSYNTHESIS BI: CPT | Mod: 26

## 2023-06-30 PROCEDURE — 77063 BREAST TOMOSYNTHESIS BI: CPT

## 2023-07-07 ENCOUNTER — APPOINTMENT (OUTPATIENT)
Dept: CARDIOLOGY | Facility: HOSPITAL | Age: 43
End: 2023-07-07

## 2023-07-27 ENCOUNTER — APPOINTMENT (OUTPATIENT)
Dept: ULTRASOUND IMAGING | Facility: HOSPITAL | Age: 43
End: 2023-07-27

## 2023-07-27 ENCOUNTER — APPOINTMENT (OUTPATIENT)
Dept: MAMMOGRAPHY | Facility: HOSPITAL | Age: 43
End: 2023-07-27

## 2023-08-01 ENCOUNTER — OUTPATIENT (OUTPATIENT)
Dept: OUTPATIENT SERVICES | Facility: HOSPITAL | Age: 43
LOS: 1 days | End: 2023-08-01
Payer: SELF-PAY

## 2023-08-01 ENCOUNTER — APPOINTMENT (OUTPATIENT)
Dept: GASTROENTEROLOGY | Facility: HOSPITAL | Age: 43
End: 2023-08-01
Payer: COMMERCIAL

## 2023-08-01 VITALS
SYSTOLIC BLOOD PRESSURE: 127 MMHG | TEMPERATURE: 96.7 F | WEIGHT: 160 LBS | HEIGHT: 62 IN | DIASTOLIC BLOOD PRESSURE: 74 MMHG | HEART RATE: 76 BPM | BODY MASS INDEX: 29.44 KG/M2

## 2023-08-01 DIAGNOSIS — R10.13 EPIGASTRIC PAIN: ICD-10-CM

## 2023-08-01 DIAGNOSIS — R10.9 UNSPECIFIED ABDOMINAL PAIN: ICD-10-CM

## 2023-08-01 PROCEDURE — G0463: CPT

## 2023-08-01 PROCEDURE — ZZZZZ: CPT | Mod: GC

## 2023-08-01 NOTE — ASSESSMENT
[FreeTextEntry1] : # Dyspepsia - Longstanding with no response to PPI. H.Pylori Ag previously negative (2021 - in the setting of similar symptoms). DDx includes GERD vs functional dyspepsia. Celiac serology were negative.  -Given ongoing sxs, plan to obtain EGD with esophageal and gastric biopsy to rule out HP and EoE (although less likely). Given patient did not respond at all to PPI and have sxs seen in GERD patients, plan to do BRAVO as well (unless obvious organic etiology on EGD). Pending results, will revisit role of TCA.   # Fatty liver - normal CMP. No fibrosis on Fibroscan from 2023, due repeat on May 2024   Recommendations: - Plan for EGD with BRAVO (esophageal/gastric biopsy)  - Lifestyle changes  - Weight loss recommended - RTC after endoscopic evaluation    Discussed with Dr. Brent Turner MD GI Fellow

## 2023-08-01 NOTE — HISTORY OF PRESENT ILLNESS
[FreeTextEntry1] : Ms. Concepcion is a 42 yo F presents PMH of HTN, HLD,  who presents for evaluation of bloating.   Patient here with dyspepsia with predominant sxs being epigastric pain/pressure leading to her voluntary burping a lot to obtain relief. This has been going on for more than a year, previously was tried on Pantoprazole daily with no sxs relief at all. Since last seen on May, she had done dietary changes (avoid carbonated beverages)  that has resulted in improvement of her sxs but still having daily sxs. Sxs usually start immediately after eating and occurs with every meal. Denies heartburn but does have regurgitation of food. Nothing makes it better or worse. She denies weight loss, dysphagia, odynophagia. nausea or vomiting, change in stool habits, melena, hematochezia or hematemesis. There is no family history of colorectal cancer. Never had endoscopy or colonoscopy.

## 2023-08-14 ENCOUNTER — RESULT REVIEW (OUTPATIENT)
Age: 43
End: 2023-08-14

## 2023-08-18 ENCOUNTER — APPOINTMENT (OUTPATIENT)
Dept: CARDIOLOGY | Facility: HOSPITAL | Age: 43
End: 2023-08-18

## 2023-09-08 ENCOUNTER — EMERGENCY (EMERGENCY)
Facility: HOSPITAL | Age: 43
LOS: 1 days | Discharge: HOME | End: 2023-09-08
Attending: STUDENT IN AN ORGANIZED HEALTH CARE EDUCATION/TRAINING PROGRAM | Admitting: STUDENT IN AN ORGANIZED HEALTH CARE EDUCATION/TRAINING PROGRAM
Payer: MEDICAID

## 2023-09-08 VITALS
OXYGEN SATURATION: 96 % | HEART RATE: 70 BPM | DIASTOLIC BLOOD PRESSURE: 73 MMHG | RESPIRATION RATE: 18 BRPM | SYSTOLIC BLOOD PRESSURE: 128 MMHG | TEMPERATURE: 98 F

## 2023-09-08 VITALS
TEMPERATURE: 98 F | SYSTOLIC BLOOD PRESSURE: 166 MMHG | RESPIRATION RATE: 16 BRPM | OXYGEN SATURATION: 98 % | WEIGHT: 160.06 LBS | DIASTOLIC BLOOD PRESSURE: 97 MMHG | HEART RATE: 88 BPM | HEIGHT: 62 IN

## 2023-09-08 LAB
ALBUMIN SERPL ELPH-MCNC: 3.5 G/DL — SIGNIFICANT CHANGE UP (ref 3.3–5)
ALP SERPL-CCNC: 70 U/L — SIGNIFICANT CHANGE UP (ref 40–120)
ALT FLD-CCNC: 30 U/L — SIGNIFICANT CHANGE UP (ref 10–45)
ANION GAP SERPL CALC-SCNC: 11 MMOL/L — SIGNIFICANT CHANGE UP (ref 5–17)
AST SERPL-CCNC: 17 U/L — SIGNIFICANT CHANGE UP (ref 10–40)
BASOPHILS # BLD AUTO: 0.03 K/UL — SIGNIFICANT CHANGE UP (ref 0–0.2)
BASOPHILS NFR BLD AUTO: 0.3 % — SIGNIFICANT CHANGE UP (ref 0–2)
BILIRUB SERPL-MCNC: 0.2 MG/DL — SIGNIFICANT CHANGE UP (ref 0.2–1.2)
BUN SERPL-MCNC: 11 MG/DL — SIGNIFICANT CHANGE UP (ref 7–23)
CALCIUM SERPL-MCNC: 8.8 MG/DL — SIGNIFICANT CHANGE UP (ref 8.4–10.5)
CHLORIDE SERPL-SCNC: 105 MMOL/L — SIGNIFICANT CHANGE UP (ref 96–108)
CO2 SERPL-SCNC: 25 MMOL/L — SIGNIFICANT CHANGE UP (ref 22–31)
CREAT SERPL-MCNC: 0.6 MG/DL — SIGNIFICANT CHANGE UP (ref 0.5–1.3)
EGFR: 115 ML/MIN/1.73M2 — SIGNIFICANT CHANGE UP
EOSINOPHIL # BLD AUTO: 0.1 K/UL — SIGNIFICANT CHANGE UP (ref 0–0.5)
EOSINOPHIL NFR BLD AUTO: 1.1 % — SIGNIFICANT CHANGE UP (ref 0–6)
GLUCOSE SERPL-MCNC: 135 MG/DL — HIGH (ref 70–99)
HCT VFR BLD CALC: 35.1 % — SIGNIFICANT CHANGE UP (ref 34.5–45)
HGB BLD-MCNC: 11.5 G/DL — SIGNIFICANT CHANGE UP (ref 11.5–15.5)
IMM GRANULOCYTES NFR BLD AUTO: 0.4 % — SIGNIFICANT CHANGE UP (ref 0–0.9)
LYMPHOCYTES # BLD AUTO: 1.37 K/UL — SIGNIFICANT CHANGE UP (ref 1–3.3)
LYMPHOCYTES # BLD AUTO: 14.6 % — SIGNIFICANT CHANGE UP (ref 13–44)
MAGNESIUM SERPL-MCNC: 1.7 MG/DL — SIGNIFICANT CHANGE UP (ref 1.6–2.6)
MCHC RBC-ENTMCNC: 27 PG — SIGNIFICANT CHANGE UP (ref 27–34)
MCHC RBC-ENTMCNC: 32.8 GM/DL — SIGNIFICANT CHANGE UP (ref 32–36)
MCV RBC AUTO: 82.4 FL — SIGNIFICANT CHANGE UP (ref 80–100)
MONOCYTES # BLD AUTO: 0.59 K/UL — SIGNIFICANT CHANGE UP (ref 0–0.9)
MONOCYTES NFR BLD AUTO: 6.3 % — SIGNIFICANT CHANGE UP (ref 2–14)
NEUTROPHILS # BLD AUTO: 7.26 K/UL — SIGNIFICANT CHANGE UP (ref 1.8–7.4)
NEUTROPHILS NFR BLD AUTO: 77.3 % — HIGH (ref 43–77)
NRBC # BLD: 0 /100 WBCS — SIGNIFICANT CHANGE UP (ref 0–0)
PLATELET # BLD AUTO: 299 K/UL — SIGNIFICANT CHANGE UP (ref 150–400)
POTASSIUM SERPL-MCNC: 3.6 MMOL/L — SIGNIFICANT CHANGE UP (ref 3.5–5.3)
POTASSIUM SERPL-SCNC: 3.6 MMOL/L — SIGNIFICANT CHANGE UP (ref 3.5–5.3)
PROT SERPL-MCNC: 7 G/DL — SIGNIFICANT CHANGE UP (ref 6–8.3)
RBC # BLD: 4.26 M/UL — SIGNIFICANT CHANGE UP (ref 3.8–5.2)
RBC # FLD: 13.9 % — SIGNIFICANT CHANGE UP (ref 10.3–14.5)
SODIUM SERPL-SCNC: 141 MMOL/L — SIGNIFICANT CHANGE UP (ref 135–145)
TROPONIN I, HIGH SENSITIVITY RESULT: 6.3 NG/L — SIGNIFICANT CHANGE UP
WBC # BLD: 9.39 K/UL — SIGNIFICANT CHANGE UP (ref 3.8–10.5)
WBC # FLD AUTO: 9.39 K/UL — SIGNIFICANT CHANGE UP (ref 3.8–10.5)

## 2023-09-08 PROCEDURE — 80053 COMPREHEN METABOLIC PANEL: CPT

## 2023-09-08 PROCEDURE — 36415 COLL VENOUS BLD VENIPUNCTURE: CPT

## 2023-09-08 PROCEDURE — 99285 EMERGENCY DEPT VISIT HI MDM: CPT | Mod: 25

## 2023-09-08 PROCEDURE — 85025 COMPLETE CBC W/AUTO DIFF WBC: CPT

## 2023-09-08 PROCEDURE — 71045 X-RAY EXAM CHEST 1 VIEW: CPT

## 2023-09-08 PROCEDURE — 71045 X-RAY EXAM CHEST 1 VIEW: CPT | Mod: 26

## 2023-09-08 PROCEDURE — 99285 EMERGENCY DEPT VISIT HI MDM: CPT

## 2023-09-08 PROCEDURE — 93005 ELECTROCARDIOGRAM TRACING: CPT

## 2023-09-08 PROCEDURE — 83735 ASSAY OF MAGNESIUM: CPT

## 2023-09-08 PROCEDURE — 84484 ASSAY OF TROPONIN QUANT: CPT

## 2023-09-08 PROCEDURE — 93010 ELECTROCARDIOGRAM REPORT: CPT

## 2023-09-08 RX ORDER — ASPIRIN/CALCIUM CARB/MAGNESIUM 324 MG
162 TABLET ORAL ONCE
Refills: 0 | Status: COMPLETED | OUTPATIENT
Start: 2023-09-08 | End: 2023-09-08

## 2023-09-08 RX ADMIN — Medication 162 MILLIGRAM(S): at 15:13

## 2023-09-08 NOTE — ED PROVIDER NOTE - NS ED ROS FT
GENERAL: No fever, no chills  EYES: No change in vision  HEENT: No trouble swallowing or speaking  CARDIAC: +chest pain  PULMONARY: No cough, no SOB  GI: No abdominal pain, no nausea, no vomiting, no diarrhea, no constipation  : No changes in urination  SKIN: No rashes  NEURO: No headache, no numbness  MSK: No joint pain  Otherwise as HPI or negative.

## 2023-09-08 NOTE — ED PROVIDER NOTE - CLINICAL SUMMARY MEDICAL DECISION MAKING FREE TEXT BOX
Logan, PGY3 - 44-year-old woman with PMH HTN, presents with recurrent left chest pain,  reproducible on exam with palpation along the left chest and left trapezius, high consideration for MSK pain at this time considering the patient had a stress and echo in April of this year that was within normal limits per her cardiologist Dr. Srivastava. labs, chest x-ray, reassess. EKG shows no ST elevations with reciprocal depressions, has ST elevation in V2 and T wave inversions in aVR that are similar to prior EKG in 2022. *The above represents an initial assessment/impression. Please refer to progress notes for potential changes in patient clinical course*

## 2023-09-08 NOTE — ED PROVIDER NOTE - ATTENDING CONTRIBUTION TO CARE
Dr. Ochoa: I have personally performed a face to face bedside history and physical examination of this patient. I have discussed the history, examination, review of systems, assessment and plan of management with the resident. I have reviewed the electronic medical record and amended it to reflect my history, review of systems, physical exam, assessment and plan.    43-year-old woman with PMH HTN, follows with cardiology Dr. Saab outpatient for chronic chest pain, had a normal stress and echo in April 2023, presenting due to recurrence of left-sided chest pain with .left shoulder pain.  Patient states that it appears to get worse when she is working and moving her arms, improves with rest. pt denies any recent fever, palpitations, worsening sob, abdominal pain, v/d, dysuria, numbness  no hx of dvt/pe     Vital signs reviewed  GENERAL: Patient nontoxic appearing, NAD  HEAD: NCAT  EYES: Anicteric  ENT: MMM  NECK: Supple, non tender  RESPIRATORY: Normal respiratory effort. CTA B/L. No wheezing, rales, rhonchi  CARDIOVASCULAR: Regular rate and rhythm  tenderness over left chest wall and shoulder  ABDOMEN: Soft. Nondistended. Nontender. No guarding or rebound. No CVA tenderness.  MUSCULOSKELETAL/EXTREMITIES: Brisk cap refill. 2+ radial pulses. No leg edema.  SKIN:  Warm and dry  NEURO: AAOx3. No gross FND.  PSYCHIATRIC: Cooperative. Affect appropriate.    ddx likely MSK,  pending EKGs/CXR/cardiac monitor/labs  2) reassess  The CXR assists in r/o PNA, Ptx & esophageal tears.  The pt doesn't appear to have a PE based on the Wells Score & there is no apparent DVT.  There are no signs of pericarditis, endocarditis, or myocarditis based on hx, risk factor analysis & the ED EKG.  There is no fever.  There also doesn't appear to be an aortic dissection based on hx, exam, and signs.  likely DC given reproducible pain, recent outpatient workup with cardiology, plan for pain control with nsaids  follow up with cards

## 2023-09-08 NOTE — ED ADULT NURSE NOTE - NSFALLUNIVINTERV_ED_ALL_ED
Bed/Stretcher in lowest position, wheels locked, appropriate side rails in place/Call bell, personal items and telephone in reach/Instruct patient to call for assistance before getting out of bed/chair/stretcher/Non-slip footwear applied when patient is off stretcher/[place] to call system/Physically safe environment - no spills, clutter or unnecessary equipment/Purposeful proactive rounding/Room/bathroom lighting operational, light cord in reach

## 2023-09-08 NOTE — ED PROVIDER NOTE - PHYSICAL EXAMINATION
Gen: NAD, AOx3, able to make needs known, non-toxic  Head: NCAT  HEENT: EOMI, normal conjunctiva  Lung: CTAB, no respiratory distress, no wheezes/rhonchi/rales B/L, speaking in full sentences  CV: RRR, no M/R/G, pulses bilaterally. Reproducible chest pain, tender to palpation over the left chest and left neck and shoulder along the trapezius  Abd: soft, NTND, no guarding, no CVA tenderness  MSK: no visible bony deformities  Neuro: No focal sensory or motor deficits  Skin: Warm, well perfused, no rash  Psych: normal affect [Negative] : Genitourinary

## 2023-09-08 NOTE — ED PROVIDER NOTE - PROGRESS NOTE DETAILS
Logan, PGY3 - labs nonactionable. Pain resolved post meds. Patient stable for discharge. Understands the Emergency Room work-up and discharge precautions. Will follow-up with cardiologist

## 2023-09-08 NOTE — ED PROVIDER NOTE - PATIENT PORTAL LINK FT
You can access the FollowMyHealth Patient Portal offered by [facility] by registering at the following website: [email or web address]. By joining [facility]’s FollowMyHealth portal, you will also be able to view your health information using other applications (apps) compatible with our system.

## 2023-09-08 NOTE — ED PROVIDER NOTE - NSFOLLOWUPINSTRUCTIONS_ED_ALL_ED_FT
Lo atendieron hoy en la marco de emergencias debido a un dolor en el pecho.    Puede delilah Tylenol y/o Motrin según las indicaciones para el dolor.  También le recomendamos realizar un seguimiento con berkowitz cardiólogo para un mayor control y diagnóstico.    LO QUE NECESITAS SABER:  El dolor de pecho puede ser causado por muchas condiciones diferentes que pueden ser peligrosas o no. Las causas incluyen acidez de estómago, infecciones pulmonares, ataques cardíacos, coágulos de dariel en los pulmones, infecciones de la piel, tensión o daño a los músculos, cartílagos o huesos, etc. El dolor en el pecho puede aparecer y desaparecer. También puede ser chacha.    INSTRUCCIONES DE DESCARGA:    Estilo de su  •Descanse según las indicaciones de berkowitz proveedor de atención médica.  •No utilice ningún producto que contenga nicotina o tabaco, john cigarrillos y cigarrillos electrónicos. Si necesita ayuda para dejar de fumar, consulte a berkowitz proveedor de atención médica.  •No tomes alcohol.  •Delilah decisiones de estilo de su saludables, p.e. hacer ejercicio con regularidad, llevar hung dieta saludable, reducir el estrés y mantener un peso saludable.    Instrucciones generales  •Preste atención a cualquier cambio en marvel síntomas. Informe a berkowitz proveedor de atención médica acerca de ellos o de cualquier síntoma nuevo.  •Evite cualquier actividad que le cause dolor en el pecho.  •Asista a todas las visitas de seguimiento indicadas por berkowitz proveedor de atención médica. [place] es importante. [place] incluye visitas para realizar más pruebas si el dolor de pecho no desaparece.      Comuníquese con un proveedor de atención médica si:  •Berkowitz dolor en el pecho no desaparece.  •Te sientes deprimido.  •Tienes fiebre.    Regrese a la Marco de Emergencias si:  •Berkowitz dolor en el pecho empeora.  •Tiene hung tos que empeora o tose dariel.  •Tiene un dolor intenso en el abdomen.  •Tiene molestias repentinas e inexplicables en los brazos, la espalda, el anisha o la mandíbula.  •Tiene dificultad para respirar en cualquier momento.  •Sientes náuseas o vomitas.  •De repente se siente aturdido o mareado, o se desmaya.  •Tiene debilidad severa o debilidad o fatiga inexplicables.  •Berkowitz corazón comienza a latir rápidamente o siente john si se saltara los latidos.    Estos síntomas pueden representar un problema grave que constituye hung emergencia. No espere a kurtis si los síntomas desaparecen. Obtenga ayuda médica de inmediato. Llame al 911 y no conduzca hasta el hospital.      -------------      You were seen in the Emergency Room today because of chest pain.     You can take Tylenol and/or Motrin as directed for pain.  We also recommend you following up with your Cardiologist for further management and workup.     WHAT YOU NEED TO KNOW:  Chest pain can be caused by many different conditions which may or may not be dangerous. Causes include heartburn, lung infections, heart attack, blood clot in lungs, skin infections, strain or damage to muscle, cartilage, or bones, etc. Your chest pain may come and go. It may also be constant.     DISCHARGE INSTRUCTIONS:    Lifestyle   •Rest as directed by your health care provider.  •Do not use any products that contain nicotine or tobacco, such as cigarettes and e-cigarettes. If you need help quitting, ask your health care provider.  •Do not drink alcohol.  •Make healthy lifestyle choices, e.g. getting regular exercise, eating a healthy diet, reducing stress, maintaining a healthy weight.     General instructions   •Pay attention to any changes in your symptoms. Tell your health care provider about them or any new symptoms.  •Avoid any activities that cause chest pain.  •Keep all follow-up visits as told by your health care provider. This is important. This includes visits for any further testing if your chest pain does not go away.      Contact a health care provider if:  •Your chest pain does not go away.  •You feel depressed.  •You have a fever.    Come back to the Emergency Room if:  •Your chest pain gets worse.  •You have a cough that gets worse, or you cough up blood.  •You have severe pain in your abdomen.  •You have sudden, unexplained discomfort in your arms, back, neck, or jaw.  •You have shortness of breath at any time.  •You feel nausea or you vomit.  •You suddenly feel lightheaded or dizzy, or you faint.  •You have severe weakness, or unexplained weakness or fatigue.  •Your heart begins to beat quickly, or it feels like it is skipping beats.    These symptoms may represent a serious problem that is an emergency. Do not wait to see if the symptoms will go away. Get medical help right away. Call 911 and do not drive yourself to the hospital.

## 2023-09-08 NOTE — ED PROVIDER NOTE - OBJECTIVE STATEMENT
43-year-old woman with PMH HTN, follows with cardiology Dr. Saab outpatient for chronic chest pain, had a normal stress and echo in April 2023, presenting due to recurrence of left-sided chest pain with .left shoulder pain.  Patient states that it appears to get worse when she is working and moving her arms, improves with rest. Has been having chronic intermittent shortness of breath for the last 3 years, currently no changes from her baseline shortness of breath.  Denies fevers, chills, nausea, vomiting, diaphoresis.  Spoke to patient in Bulgarian.

## 2023-09-11 ENCOUNTER — NON-APPOINTMENT (OUTPATIENT)
Age: 43
End: 2023-09-11

## 2023-09-13 ENCOUNTER — RESULT REVIEW (OUTPATIENT)
Age: 43
End: 2023-09-13

## 2023-09-13 ENCOUNTER — OUTPATIENT (OUTPATIENT)
Dept: OUTPATIENT SERVICES | Facility: HOSPITAL | Age: 43
LOS: 1 days | End: 2023-09-13
Payer: SELF-PAY

## 2023-09-13 ENCOUNTER — APPOINTMENT (OUTPATIENT)
Dept: MAMMOGRAPHY | Facility: HOSPITAL | Age: 43
End: 2023-09-13
Payer: COMMERCIAL

## 2023-09-13 ENCOUNTER — APPOINTMENT (OUTPATIENT)
Dept: ULTRASOUND IMAGING | Facility: HOSPITAL | Age: 43
End: 2023-09-13
Payer: COMMERCIAL

## 2023-09-13 DIAGNOSIS — R92.2 INCONCLUSIVE MAMMOGRAM: ICD-10-CM

## 2023-09-13 PROCEDURE — 77065 DX MAMMO INCL CAD UNI: CPT | Mod: 26,LT

## 2023-09-13 PROCEDURE — G0279: CPT | Mod: 26

## 2023-09-13 PROCEDURE — 76641 ULTRASOUND BREAST COMPLETE: CPT | Mod: 26,LT

## 2023-09-13 PROCEDURE — 77065 DX MAMMO INCL CAD UNI: CPT

## 2023-09-13 PROCEDURE — 76641 ULTRASOUND BREAST COMPLETE: CPT

## 2023-09-13 PROCEDURE — G0279: CPT

## 2023-09-14 ENCOUNTER — OUTPATIENT (OUTPATIENT)
Dept: OUTPATIENT SERVICES | Facility: HOSPITAL | Age: 43
LOS: 1 days | End: 2023-09-14
Payer: MEDICAID

## 2023-09-14 VITALS
DIASTOLIC BLOOD PRESSURE: 89 MMHG | RESPIRATION RATE: 16 BRPM | SYSTOLIC BLOOD PRESSURE: 130 MMHG | OXYGEN SATURATION: 98 % | TEMPERATURE: 98 F | WEIGHT: 153 LBS | HEIGHT: 64 IN | HEART RATE: 77 BPM

## 2023-09-14 DIAGNOSIS — Z98.890 OTHER SPECIFIED POSTPROCEDURAL STATES: Chronic | ICD-10-CM

## 2023-09-14 DIAGNOSIS — R10.13 EPIGASTRIC PAIN: ICD-10-CM

## 2023-09-14 DIAGNOSIS — Z98.891 HISTORY OF UTERINE SCAR FROM PREVIOUS SURGERY: Chronic | ICD-10-CM

## 2023-09-14 DIAGNOSIS — Z01.818 ENCOUNTER FOR OTHER PREPROCEDURAL EXAMINATION: ICD-10-CM

## 2023-09-14 LAB
ANION GAP SERPL CALC-SCNC: 12 MMOL/L — SIGNIFICANT CHANGE UP (ref 5–17)
BUN SERPL-MCNC: 9 MG/DL — SIGNIFICANT CHANGE UP (ref 7–23)
CALCIUM SERPL-MCNC: 9 MG/DL — SIGNIFICANT CHANGE UP (ref 8.4–10.5)
CHLORIDE SERPL-SCNC: 104 MMOL/L — SIGNIFICANT CHANGE UP (ref 96–108)
CO2 SERPL-SCNC: 25 MMOL/L — SIGNIFICANT CHANGE UP (ref 22–31)
CREAT SERPL-MCNC: 0.56 MG/DL — SIGNIFICANT CHANGE UP (ref 0.5–1.3)
EGFR: 117 ML/MIN/1.73M2 — SIGNIFICANT CHANGE UP
GLUCOSE SERPL-MCNC: 97 MG/DL — SIGNIFICANT CHANGE UP (ref 70–99)
HCT VFR BLD CALC: 37.6 % — SIGNIFICANT CHANGE UP (ref 34.5–45)
HGB BLD-MCNC: 11.9 G/DL — SIGNIFICANT CHANGE UP (ref 11.5–15.5)
MCHC RBC-ENTMCNC: 26.4 PG — LOW (ref 27–34)
MCHC RBC-ENTMCNC: 31.6 GM/DL — LOW (ref 32–36)
MCV RBC AUTO: 83.4 FL — SIGNIFICANT CHANGE UP (ref 80–100)
NRBC # BLD: 0 /100 WBCS — SIGNIFICANT CHANGE UP (ref 0–0)
PLATELET # BLD AUTO: 308 K/UL — SIGNIFICANT CHANGE UP (ref 150–400)
POTASSIUM SERPL-MCNC: 3.7 MMOL/L — SIGNIFICANT CHANGE UP (ref 3.5–5.3)
POTASSIUM SERPL-SCNC: 3.7 MMOL/L — SIGNIFICANT CHANGE UP (ref 3.5–5.3)
RBC # BLD: 4.51 M/UL — SIGNIFICANT CHANGE UP (ref 3.8–5.2)
RBC # FLD: 13.8 % — SIGNIFICANT CHANGE UP (ref 10.3–14.5)
SODIUM SERPL-SCNC: 141 MMOL/L — SIGNIFICANT CHANGE UP (ref 135–145)
WBC # BLD: 4.85 K/UL — SIGNIFICANT CHANGE UP (ref 3.8–10.5)
WBC # FLD AUTO: 4.85 K/UL — SIGNIFICANT CHANGE UP (ref 3.8–10.5)

## 2023-09-14 PROCEDURE — 80048 BASIC METABOLIC PNL TOTAL CA: CPT

## 2023-09-14 PROCEDURE — 85027 COMPLETE CBC AUTOMATED: CPT

## 2023-09-14 PROCEDURE — G0463: CPT

## 2023-09-14 PROCEDURE — 36415 COLL VENOUS BLD VENIPUNCTURE: CPT

## 2023-09-14 NOTE — H&P PST ADULT - NSICDXPASTMEDICALHX_GEN_ALL_CORE_FT
PAST MEDICAL HISTORY:  Aerophagia     Chest discomfort     Dyspepsia     Fatty liver     High blood pressure     HLD (hyperlipidemia)

## 2023-09-14 NOTE — H&P PST ADULT - HISTORY OF PRESENT ILLNESS
42 YO F PMH HTN, chronic chest pain, recently presented to [facility] ED 9/8/23 for recurrence of left-sided chest pain with left shoulder pain, chronic intermittent shortness of breath x3 years (currently no changes from her baseline shortness of breath), dyspepsia with epigastric pain/pressure leading to a lot of burping, presents to PST for EGD BRAVO. Denies any N/V, fever or chills.    44 YO Citizen of [place] speaking female PMH HTN, chronic chest pain, recently presented to [facility] ED 9/8/23 for recurrence of left-sided chest pain with left shoulder pain, chronic intermittent shortness of breath x3 years (currently no changes from her baseline shortness of breath), dyspepsia with epigastric pain/pressure leading to a lot of burping, presents to PST for EGD BRAVO. Denies any N/V, fever or chills.    44 YO Camb[place]ian speaking female PMH HTN, chronic atypical chest pain - recently presented to [facility] ED 9/8/23 for recurrence of left-sided chest pain with left shoulder pain, chronic intermittent shortness of breath x3 years (currently no changes from her baseline shortness of breath), dyspepsia (unresponsive to PPIs) with epigastric pain/pressure leading to a lot of burping, presents to PST for EGD BRAVO. Denies any N/V, fever or chills.

## 2023-09-14 NOTE — H&P PST ADULT - OTHER CARE PROVIDERS
Dr. Saab (cardiology) Pt cannot recall cardiologist's name. Dr. Saab (cardiology) per hospital discharge note.

## 2023-09-14 NOTE — H&P PST ADULT - ASSESSMENT
DASI score: 8 mets   DASI activity: ADLs, walking, stairs, cleans houses for a living  Loose teeth or denture: denies loose teeth, has removable upper partial 4 front teeth

## 2023-09-14 NOTE — H&P PST ADULT - ALCOHOL USE HISTORY SINGLE SELECT
78y female with PMHx of HTN, HLD admitted with new onset atrial fibrillation with rapid ventricular response and rhabdomyolysis s/p fall at home. never

## 2023-09-20 ENCOUNTER — OUTPATIENT (OUTPATIENT)
Dept: OUTPATIENT SERVICES | Facility: HOSPITAL | Age: 43
LOS: 1 days | End: 2023-09-20
Payer: MEDICAID

## 2023-09-20 ENCOUNTER — TRANSCRIPTION ENCOUNTER (OUTPATIENT)
Age: 43
End: 2023-09-20

## 2023-09-20 ENCOUNTER — RESULT REVIEW (OUTPATIENT)
Age: 43
End: 2023-09-20

## 2023-09-20 VITALS
DIASTOLIC BLOOD PRESSURE: 75 MMHG | WEIGHT: 160.06 LBS | OXYGEN SATURATION: 100 % | HEIGHT: 64 IN | HEART RATE: 72 BPM | SYSTOLIC BLOOD PRESSURE: 136 MMHG | RESPIRATION RATE: 15 BRPM | TEMPERATURE: 98 F

## 2023-09-20 VITALS
OXYGEN SATURATION: 97 % | RESPIRATION RATE: 17 BRPM | SYSTOLIC BLOOD PRESSURE: 127 MMHG | DIASTOLIC BLOOD PRESSURE: 71 MMHG | HEART RATE: 70 BPM

## 2023-09-20 DIAGNOSIS — Z98.890 OTHER SPECIFIED POSTPROCEDURAL STATES: Chronic | ICD-10-CM

## 2023-09-20 DIAGNOSIS — R10.13 EPIGASTRIC PAIN: ICD-10-CM

## 2023-09-20 DIAGNOSIS — Z98.891 HISTORY OF UTERINE SCAR FROM PREVIOUS SURGERY: Chronic | ICD-10-CM

## 2023-09-20 PROCEDURE — C1889: CPT

## 2023-09-20 PROCEDURE — 88305 TISSUE EXAM BY PATHOLOGIST: CPT

## 2023-09-20 PROCEDURE — 88305 TISSUE EXAM BY PATHOLOGIST: CPT | Mod: 26

## 2023-09-20 PROCEDURE — 43239 EGD BIOPSY SINGLE/MULTIPLE: CPT

## 2023-09-20 DEVICE — IMPLANTABLE DEVICE: Type: IMPLANTABLE DEVICE | Status: FUNCTIONAL

## 2023-09-20 RX ORDER — LISINOPRIL 2.5 MG/1
1 TABLET ORAL
Refills: 0 | DISCHARGE

## 2023-09-20 RX ORDER — LIDOCAINE HCL 20 MG/ML
4 VIAL (ML) INJECTION ONCE
Refills: 0 | Status: DISCONTINUED | OUTPATIENT
Start: 2023-09-20 | End: 2023-10-05

## 2023-09-20 RX ORDER — SODIUM CHLORIDE 9 MG/ML
500 INJECTION INTRAMUSCULAR; INTRAVENOUS; SUBCUTANEOUS
Refills: 0 | Status: DISCONTINUED | OUTPATIENT
Start: 2023-09-20 | End: 2023-10-05

## 2023-09-20 RX ORDER — METOPROLOL TARTRATE 50 MG
1 TABLET ORAL
Refills: 0 | DISCHARGE

## 2023-09-20 NOTE — PRE-ANESTHESIA EVALUATION ADULT - NSRADCARDRESULTSFT_GEN_ALL_CORE
< from: TTE Echo Complete w/o Contrast w/ Doppler (22 @ 10:14) >    2D AND M-MODE MEASUREMENTS (normal ranges within parentheses):  Left Ventricle:                  Normal   Aorta/Left Atrium:               Normal  IVSd (2D):              1.07 cm (0.7-1.1) Aortic Root (Mmode): 2.70 cm   (2.4-3.7)  LVPWd (2D):             1.00 cm (0.7-1.1) AoV Cusp Separation: 1.80 cm   (1.5-2.6)  LVIDd (2D):             4.59 cm (3.4-5.7) Left Atrium (Mmode): 3.33 cm   (1.9-4.0)  LVIDs (2D):             3.26 cm  LV FS (2D):             28.9 %   (>25%)  LV EF (2D):              56 %    (>55%)  Relative Wall Thickness  0.44    (<0.42)    LV DIASTOLIC FUNCTION:  MV Peak E: 0.67 m/s Decel Time: 180 msec  MV Peak A: 0.66 m/s  E/A Ratio: 1.02    SPECTRAL DOPPLER ANALYSIS (where applicable):  Mitral Valve:  MV P1/2 Time: 52.07 msec  MV Area, PHT: 4.22 cm²    Aortic Valve: AoV Max Joby: 1.50 m/s AoV Peak P.0 mmHg AoV Mean P.3 mmHg    LVOT Vmax: 1.10 m/s LVOT VTI: 0.231 m LVOT Diameter: 2.00 cm    AoV Area, Vmax: 2.31 cm² AoV Area, VTI: 3.00 cm² AoV Area, Vmn: 2.25 cm²  Ao VTI: 0.241  Tricuspid Valve and PA/RV Systolic Pressure: TR Max Velocity: 1.52 m/s RA   Pressure: 10 mmHg RVSP/PASP: 19.2 mmHg      PHYSICIAN INTERPRETATION:  Left Ventricle: Normal left ventricular size and wall thicknesses, with   normal systolic and diastolic function. The left ventricular internal   cavity size is normal. Left ventricular wall thickness is normal.  Global LV systolic function was normal. Left ventricular ejection   fraction, by visual estimation, is 50 to 55%. Spectral Doppler shows   normal pattern of LV diastolic filling.  Right Ventricle: Normal right ventricular size and function. The right   ventricular size is normal.  Left Atrium: Normal left atrial size.  Right Atrium: Normal right atrial size.  Pericardium: There is no evidence of pericardial effusion.  Mitral Valve: Structurally normal mitral valve, with normal leaflet   excursion. Mild mitral valve regurgitation is seen.  Tricuspid Valve: The tricuspid valve is normal in structure. Mild   tricuspid regurgitation is visualized.  Aortic Valve: Sclerotic aortic valve with normal opening.  Pulmonic Valve: The pulmonic valve was not well visualized. The pulmonic   valve is normal.  Aorta: The aortic root is normal in size and structure.      Summary:   1. Left ventricular ejection fraction, by visual estimation, is 50 to   55%.   2. Normal global left ventricular systolic function.   3. Normal left ventricular size and wall thicknesses, with normal   systolic and diastolic function.   4. Normal right ventricular size and function.   5. Normal left atrial size.   6. Normal right atrial size.   7. Mild mitral valve regurgitation.   8. Mild tricuspid regurgitation.   9. Sclerotic aortic valve with normal opening.    < end of copied text >    < from: TTE Echo Complete w/o Contrast w/ Doppler (22 @ 10:14) >    2D AND M-MODE MEASUREMENTS (normal ranges within parentheses):  Left Ventricle:                  Normal   Aorta/Left Atrium:               Normal  IVSd (2D):              1.07 cm (0.7-1.1) Aortic Root (Mmode): 2.70 cm   (2.4-3.7)  LVPWd (2D):             1.00 cm (0.7-1.1) AoV Cusp Separation: 1.80 cm   (1.5-2.6)  LVIDd (2D):             4.59 cm (3.4-5.7) Left Atrium (Mmode): 3.33 cm   (1.9-4.0)  LVIDs (2D):             3.26 cm  LV FS (2D):             28.9 %   (>25%)  LV EF (2D):              56 %    (>55%)  Relative Wall Thickness  0.44    (<0.42)    LV DIASTOLIC FUNCTION:  MV Peak E: 0.67 m/s Decel Time: 180 msec  MV Peak A: 0.66 m/s  E/A Ratio: 1.02    SPECTRAL DOPPLER ANALYSIS (where applicable):  Mitral Valve:  MV P1/2 Time: 52.07 msec  MV Area, PHT: 4.22 cm²    Aortic Valve: AoV Max Joby: 1.50 m/s AoV Peak P.0 mmHg AoV Mean P.3 mmHg    LVOT Vmax: 1.10 m/s LVOT VTI: 0.231 m LVOT Diameter: 2.00 cm    AoV Area, Vmax: 2.31 cm² AoV Area, VTI: 3.00 cm² AoV Area, Vmn: 2.25 cm²  Ao VTI: 0.241  Tricuspid Valve and PA/RV Systolic Pressure: TR Max Velocity: 1.52 m/s RA   Pressure: 10 mmHg RVSP/PASP: 19.2 mmHg      PHYSICIAN INTERPRETATION:  Left Ventricle: Normal left ventricular size and wall thicknesses, with   normal systolic and diastolic function. The left ventricular internal   cavity size is normal. Left ventricular wall thickness is normal.  Global LV systolic function was normal. Left ventricular ejection   fraction, by visual estimation, is 50 to 55%. Spectral Doppler shows   normal pattern of LV diastolic filling.  Right Ventricle: Normal right ventricular size and function. The right   ventricular size is normal.  Left Atrium: Normal left atrial size.  Right Atrium: Normal right atrial size.  Pericardium: There is no evidence of pericardial effusion.  Mitral Valve: Structurally normal mitral valve, with normal leaflet   excursion. Mild mitral valve regurgitation is seen.  Tricuspid Valve: The tricuspid valve is normal in structure. Mild   tricuspid regurgitation is visualized.  Aortic Valve: Sclerotic aortic valve with normal opening.  Pulmonic Valve: The pulmonic valve was not well visualized. The pulmonic   valve is normal.  Aorta: The aortic root is normal in size and structure.      Summary:   1. Left ventricular ejection fraction, by visual estimation, is 50 to   55%.   2. Normal global left ventricular systolic function.   3. Normal left ventricular size and wall thicknesses, with normal   systolic and diastolic function.   4. Normal right ventricular size and function.   5. Normal left atrial size.   6. Normal right atrial size.   7. Mild mitral valve regurgitation.   8. Mild tricuspid regurgitation.   9. Sclerotic aortic valve with normal opening.    < end of copied text >

## 2023-09-20 NOTE — PRE-ANESTHESIA EVALUATION ADULT - NSANTHPEFT_GEN_ALL_CORE
GENERAL: NAD  HEAD:  Atraumatic, Normocephalic  CHEST/LUNG: Clear to auscultation bilaterally  HEART: Normal S1/S2
NAD  LUNGS CLEAR  COR RRR S1S2

## 2023-09-20 NOTE — PRE-ANESTHESIA EVALUATION ADULT - NSANTHINDVINFOSD_GEN_ALL_CORE
patient
patient
Dutasteride Pregnancy And Lactation Text: This medication is absolutely contraindicated in women, especially during pregnancy and breast feeding. Feminization of male fetuses is possible if taking while pregnant.

## 2023-09-20 NOTE — PRE PROCEDURE NOTE - PRE PROCEDURE EVALUATION
Attending Physician:              Zane Mejia MD MSEd    Indication for Procedure       GERD, Dyspepsia         PAST MEDICAL & SURGICAL HISTORY:  High blood pressure      Dyspepsia      HLD (hyperlipidemia)      Aerophagia      Chest discomfort      Fatty liver      H/O  section      History of D&C            See Allscripts Note for further details  ALLERGIES:  No Known Allergies    HOME MEDICATIONS:  lisinopril 20 mg oral tablet: 1 orally once a day (at bedtime)  metoprolol succinate 25 mg oral capsule, extended release: 1 orally once a day      See Allscripts Note for further details    AICD/PPM: [ ] yes   [ x] no    PERTINENT LAB DATA:                      PHYSICAL EXAMINATION:    Height (cm): 162.6  Weight (kg): 72.6  BMI (kg/m2): 27.5  BSA (m2): 1.78T(C): 36.7  HR: 72  BP: 136/75  RR: 15  SpO2: 100%    Constitutional: NAD  HEENT: PERRLA, EOMI,    Neck:  No JVD  Respiratory: CTAB/L  Cardiovascular: S1 and S2  Gastrointestinal: BS+, soft, NT/ND  Extremities: No peripheral edema  Neurological: A/O x 3, no focal deficits  Psychiatric: Normal mood, normal affect  Skin: No rashes    ASA Class: I [ ]  II [ ]  III [x ]  IV [ ]    COMMENTS:    The patient is a suitable candidate for the planned procedure unless box checked [ ]  No, explain:

## 2023-09-20 NOTE — PRE-ANESTHESIA EVALUATION ADULT - NSANTHPMHFT_GEN_ALL_CORE
44 YO Kazakh speaking female PMH HTN, chronic atypical chest pain - recently presented to [facility] ED 9/8/23 for recurrence of left-sided chest pain with left shoulder pain, chronic intermittent shortness of breath x3 years (currently no changes from her baseline shortness of breath), dyspepsia (unresponsive to PPIs) with epigastric pain/pressure leading to a lot of burping, presents for EGD BRAVO.

## 2023-09-25 PROBLEM — F45.8 OTHER SOMATOFORM DISORDERS: Chronic | Status: ACTIVE | Noted: 2023-09-14

## 2023-09-25 PROBLEM — R07.89 OTHER CHEST PAIN: Chronic | Status: ACTIVE | Noted: 2023-09-14

## 2023-09-25 PROBLEM — E78.5 HYPERLIPIDEMIA, UNSPECIFIED: Chronic | Status: ACTIVE | Noted: 2023-09-14

## 2023-09-25 PROBLEM — R10.13 EPIGASTRIC PAIN: Chronic | Status: ACTIVE | Noted: 2023-09-14

## 2023-09-25 PROBLEM — K76.0 FATTY (CHANGE OF) LIVER, NOT ELSEWHERE CLASSIFIED: Chronic | Status: ACTIVE | Noted: 2023-09-14

## 2023-09-25 LAB — SURGICAL PATHOLOGY STUDY: SIGNIFICANT CHANGE UP

## 2023-10-12 ENCOUNTER — APPOINTMENT (OUTPATIENT)
Dept: FAMILY MEDICINE | Facility: HOSPITAL | Age: 43
End: 2023-10-12

## 2023-10-12 ENCOUNTER — OUTPATIENT (OUTPATIENT)
Dept: OUTPATIENT SERVICES | Facility: HOSPITAL | Age: 43
LOS: 1 days | End: 2023-10-12
Payer: SELF-PAY

## 2023-10-12 VITALS
SYSTOLIC BLOOD PRESSURE: 120 MMHG | BODY MASS INDEX: 27.62 KG/M2 | HEART RATE: 79 BPM | OXYGEN SATURATION: 98 % | RESPIRATION RATE: 14 BRPM | DIASTOLIC BLOOD PRESSURE: 74 MMHG | TEMPERATURE: 98.4 F | WEIGHT: 151 LBS

## 2023-10-12 DIAGNOSIS — Z00.00 ENCOUNTER FOR GENERAL ADULT MEDICAL EXAMINATION WITHOUT ABNORMAL FINDINGS: ICD-10-CM

## 2023-10-12 DIAGNOSIS — R92.2 INCONCLUSIVE MAMMOGRAM: ICD-10-CM

## 2023-10-12 DIAGNOSIS — Z98.891 HISTORY OF UTERINE SCAR FROM PREVIOUS SURGERY: Chronic | ICD-10-CM

## 2023-10-12 DIAGNOSIS — R06.02 SHORTNESS OF BREATH: ICD-10-CM

## 2023-10-12 DIAGNOSIS — Z98.890 OTHER SPECIFIED POSTPROCEDURAL STATES: Chronic | ICD-10-CM

## 2023-10-12 PROCEDURE — G0463: CPT

## 2023-10-17 PROBLEM — R92.2 INCONCLUSIVE MAMMOGRAM: Status: ACTIVE | Noted: 2023-08-14

## 2023-10-19 ENCOUNTER — RESULT CHARGE (OUTPATIENT)
Age: 43
End: 2023-10-19

## 2023-10-20 ENCOUNTER — APPOINTMENT (OUTPATIENT)
Dept: CARDIOLOGY | Facility: HOSPITAL | Age: 43
End: 2023-10-20
Payer: COMMERCIAL

## 2023-10-20 ENCOUNTER — OUTPATIENT (OUTPATIENT)
Dept: OUTPATIENT SERVICES | Facility: HOSPITAL | Age: 43
LOS: 1 days | End: 2023-10-20
Payer: SELF-PAY

## 2023-10-20 VITALS
SYSTOLIC BLOOD PRESSURE: 134 MMHG | HEART RATE: 71 BPM | BODY MASS INDEX: 27.98 KG/M2 | TEMPERATURE: 98 F | RESPIRATION RATE: 16 BRPM | WEIGHT: 153 LBS | DIASTOLIC BLOOD PRESSURE: 76 MMHG | OXYGEN SATURATION: 97 %

## 2023-10-20 DIAGNOSIS — Z98.890 OTHER SPECIFIED POSTPROCEDURAL STATES: Chronic | ICD-10-CM

## 2023-10-20 DIAGNOSIS — Z29.9 ENCOUNTER FOR PROPHYLACTIC MEASURES, UNSPECIFIED: ICD-10-CM

## 2023-10-20 DIAGNOSIS — Z00.00 ENCOUNTER FOR GENERAL ADULT MEDICAL EXAMINATION WITHOUT ABNORMAL FINDINGS: ICD-10-CM

## 2023-10-20 DIAGNOSIS — R07.89 OTHER CHEST PAIN: ICD-10-CM

## 2023-10-20 DIAGNOSIS — Z98.891 HISTORY OF UTERINE SCAR FROM PREVIOUS SURGERY: Chronic | ICD-10-CM

## 2023-10-20 DIAGNOSIS — Z87.898 PERSONAL HISTORY OF OTHER SPECIFIED CONDITIONS: ICD-10-CM

## 2023-10-20 PROCEDURE — 93010 ELECTROCARDIOGRAM REPORT: CPT

## 2023-10-20 PROCEDURE — G0463: CPT

## 2023-10-20 PROCEDURE — 93005 ELECTROCARDIOGRAM TRACING: CPT

## 2023-10-23 DIAGNOSIS — R07.89 OTHER CHEST PAIN: ICD-10-CM

## 2023-10-23 DIAGNOSIS — R10.13 EPIGASTRIC PAIN: ICD-10-CM

## 2023-11-13 ENCOUNTER — OUTPATIENT (OUTPATIENT)
Dept: OUTPATIENT SERVICES | Facility: HOSPITAL | Age: 43
LOS: 1 days | End: 2023-11-13
Payer: SELF-PAY

## 2023-11-13 ENCOUNTER — APPOINTMENT (OUTPATIENT)
Dept: FAMILY MEDICINE | Facility: HOSPITAL | Age: 43
End: 2023-11-13

## 2023-11-13 VITALS
HEART RATE: 88 BPM | DIASTOLIC BLOOD PRESSURE: 68 MMHG | BODY MASS INDEX: 28.17 KG/M2 | SYSTOLIC BLOOD PRESSURE: 141 MMHG | WEIGHT: 154 LBS | TEMPERATURE: 98.5 F | RESPIRATION RATE: 16 BRPM | OXYGEN SATURATION: 97 %

## 2023-11-13 DIAGNOSIS — Z00.00 ENCOUNTER FOR GENERAL ADULT MEDICAL EXAMINATION WITHOUT ABNORMAL FINDINGS: ICD-10-CM

## 2023-11-13 DIAGNOSIS — N63.41 UNSPECIFIED LUMP IN RIGHT BREAST, SUBAREOLAR: ICD-10-CM

## 2023-11-13 DIAGNOSIS — N63.10 UNSPECIFIED LUMP IN THE RIGHT BREAST, UNSPECIFIED QUADRANT: ICD-10-CM

## 2023-11-13 DIAGNOSIS — Z98.891 HISTORY OF UTERINE SCAR FROM PREVIOUS SURGERY: Chronic | ICD-10-CM

## 2023-11-13 DIAGNOSIS — Z98.890 OTHER SPECIFIED POSTPROCEDURAL STATES: Chronic | ICD-10-CM

## 2023-11-13 PROCEDURE — G0463: CPT

## 2023-11-13 RX ORDER — PANTOPRAZOLE 40 MG/1
40 TABLET, DELAYED RELEASE ORAL
Qty: 30 | Refills: 2 | Status: DISCONTINUED | COMMUNITY
Start: 2022-09-24 | End: 2023-11-13

## 2023-11-13 RX ORDER — SIMETHICONE 125 MG/1
125 TABLET, CHEWABLE ORAL
Qty: 120 | Refills: 0 | Status: DISCONTINUED | COMMUNITY
Start: 2023-02-07 | End: 2023-11-13

## 2023-11-13 RX ORDER — PANTOPRAZOLE 40 MG/1
40 TABLET, DELAYED RELEASE ORAL DAILY
Qty: 30 | Refills: 1 | Status: DISCONTINUED | COMMUNITY
Start: 2023-10-20 | End: 2023-11-13

## 2023-11-14 PROBLEM — N63.41 SUBAREOLAR MASS OF RIGHT BREAST: Status: ACTIVE | Noted: 2023-11-13

## 2023-11-18 ENCOUNTER — APPOINTMENT (OUTPATIENT)
Dept: FAMILY MEDICINE | Facility: HOSPITAL | Age: 43
End: 2023-11-18

## 2023-11-21 ENCOUNTER — APPOINTMENT (OUTPATIENT)
Dept: FAMILY MEDICINE | Facility: HOSPITAL | Age: 43
End: 2023-11-21

## 2023-11-21 ENCOUNTER — OUTPATIENT (OUTPATIENT)
Dept: OUTPATIENT SERVICES | Facility: HOSPITAL | Age: 43
LOS: 1 days | End: 2023-11-21
Payer: SELF-PAY

## 2023-11-21 VITALS
DIASTOLIC BLOOD PRESSURE: 85 MMHG | WEIGHT: 150 LBS | SYSTOLIC BLOOD PRESSURE: 143 MMHG | OXYGEN SATURATION: 97 % | BODY MASS INDEX: 27.6 KG/M2 | RESPIRATION RATE: 14 BRPM | TEMPERATURE: 98.1 F | HEART RATE: 95 BPM | HEIGHT: 62 IN

## 2023-11-21 VITALS — SYSTOLIC BLOOD PRESSURE: 128 MMHG | DIASTOLIC BLOOD PRESSURE: 84 MMHG

## 2023-11-21 DIAGNOSIS — Z98.891 HISTORY OF UTERINE SCAR FROM PREVIOUS SURGERY: Chronic | ICD-10-CM

## 2023-11-21 DIAGNOSIS — N63.10 UNSPECIFIED LUMP IN THE RIGHT BREAST, UNSPECIFIED QUADRANT: ICD-10-CM

## 2023-11-21 DIAGNOSIS — I10 ESSENTIAL (PRIMARY) HYPERTENSION: ICD-10-CM

## 2023-11-21 DIAGNOSIS — Z98.890 OTHER SPECIFIED POSTPROCEDURAL STATES: Chronic | ICD-10-CM

## 2023-11-21 DIAGNOSIS — Z00.00 ENCOUNTER FOR GENERAL ADULT MEDICAL EXAMINATION WITHOUT ABNORMAL FINDINGS: ICD-10-CM

## 2023-11-21 PROCEDURE — G0463: CPT

## 2023-12-07 ENCOUNTER — OUTPATIENT (OUTPATIENT)
Dept: OUTPATIENT SERVICES | Facility: HOSPITAL | Age: 43
LOS: 1 days | End: 2023-12-07

## 2023-12-07 DIAGNOSIS — Z98.890 OTHER SPECIFIED POSTPROCEDURAL STATES: Chronic | ICD-10-CM

## 2023-12-07 DIAGNOSIS — Z98.891 HISTORY OF UTERINE SCAR FROM PREVIOUS SURGERY: Chronic | ICD-10-CM

## 2023-12-07 DIAGNOSIS — Z00.00 ENCOUNTER FOR GENERAL ADULT MEDICAL EXAMINATION WITHOUT ABNORMAL FINDINGS: ICD-10-CM

## 2023-12-07 DIAGNOSIS — N64.3 GALACTORRHEA NOT ASSOCIATED WITH CHILDBIRTH: ICD-10-CM

## 2023-12-15 ENCOUNTER — RESULT REVIEW (OUTPATIENT)
Age: 43
End: 2023-12-15

## 2023-12-15 ENCOUNTER — APPOINTMENT (OUTPATIENT)
Dept: ULTRASOUND IMAGING | Facility: HOSPITAL | Age: 43
End: 2023-12-15
Payer: COMMERCIAL

## 2023-12-15 ENCOUNTER — OUTPATIENT (OUTPATIENT)
Dept: OUTPATIENT SERVICES | Facility: HOSPITAL | Age: 43
LOS: 1 days | End: 2023-12-15
Payer: SELF-PAY

## 2023-12-15 DIAGNOSIS — N63.10 UNSPECIFIED LUMP IN THE RIGHT BREAST, UNSPECIFIED QUADRANT: ICD-10-CM

## 2023-12-15 DIAGNOSIS — Z98.890 OTHER SPECIFIED POSTPROCEDURAL STATES: Chronic | ICD-10-CM

## 2023-12-15 DIAGNOSIS — Z98.891 HISTORY OF UTERINE SCAR FROM PREVIOUS SURGERY: Chronic | ICD-10-CM

## 2023-12-15 PROCEDURE — 76642 ULTRASOUND BREAST LIMITED: CPT | Mod: 26,RT

## 2023-12-15 PROCEDURE — 76642 ULTRASOUND BREAST LIMITED: CPT

## 2023-12-20 ENCOUNTER — APPOINTMENT (OUTPATIENT)
Dept: FAMILY MEDICINE | Facility: HOSPITAL | Age: 43
End: 2023-12-20

## 2023-12-20 ENCOUNTER — RESULT REVIEW (OUTPATIENT)
Age: 43
End: 2023-12-20

## 2023-12-20 ENCOUNTER — OUTPATIENT (OUTPATIENT)
Dept: OUTPATIENT SERVICES | Facility: HOSPITAL | Age: 43
LOS: 1 days | End: 2023-12-20
Payer: SELF-PAY

## 2023-12-20 VITALS
BODY MASS INDEX: 27.44 KG/M2 | TEMPERATURE: 97.9 F | HEART RATE: 85 BPM | DIASTOLIC BLOOD PRESSURE: 88 MMHG | RESPIRATION RATE: 14 BRPM | SYSTOLIC BLOOD PRESSURE: 138 MMHG | WEIGHT: 150 LBS | OXYGEN SATURATION: 99 %

## 2023-12-20 DIAGNOSIS — R10.13 EPIGASTRIC PAIN: ICD-10-CM

## 2023-12-20 DIAGNOSIS — N60.01 SOLITARY CYST OF RIGHT BREAST: ICD-10-CM

## 2023-12-20 DIAGNOSIS — Z23 ENCOUNTER FOR IMMUNIZATION: ICD-10-CM

## 2023-12-20 DIAGNOSIS — Z98.890 OTHER SPECIFIED POSTPROCEDURAL STATES: Chronic | ICD-10-CM

## 2023-12-20 DIAGNOSIS — Z00.00 ENCOUNTER FOR GENERAL ADULT MEDICAL EXAMINATION WITHOUT ABNORMAL FINDINGS: ICD-10-CM

## 2023-12-20 DIAGNOSIS — G44.019 EPISODIC CLUSTER HEADACHE, NOT INTRACTABLE: ICD-10-CM

## 2023-12-20 DIAGNOSIS — G44.209 TENSION-TYPE HEADACHE, UNSPECIFIED, NOT INTRACTABLE: ICD-10-CM

## 2023-12-20 DIAGNOSIS — Z98.891 HISTORY OF UTERINE SCAR FROM PREVIOUS SURGERY: Chronic | ICD-10-CM

## 2023-12-20 PROCEDURE — G0463: CPT

## 2023-12-20 RX ORDER — ASPIRIN/ACETAMINOPHEN/CAFFEINE 250-250-65
250-250-65 TABLET ORAL 3 TIMES DAILY
Qty: 1 | Refills: 0 | Status: ACTIVE | COMMUNITY
Start: 2023-12-20 | End: 1900-01-01

## 2023-12-21 PROBLEM — N60.01 CYST OF RIGHT BREAST: Status: ACTIVE | Noted: 2022-08-18

## 2023-12-21 PROBLEM — R10.13 DYSPEPSIA: Status: ACTIVE | Noted: 2023-02-07

## 2023-12-21 PROBLEM — G44.209 TENSION-TYPE HEADACHE, NOT INTRACTABLE, UNSPECIFIED CHRONICITY PATTERN: Status: RESOLVED | Noted: 2023-12-20 | Resolved: 2023-12-21

## 2023-12-21 PROBLEM — G44.019 EPISODIC CLUSTER HEADACHE, NOT INTRACTABLE: Status: ACTIVE | Noted: 2023-12-20

## 2023-12-21 NOTE — HISTORY OF PRESENT ILLNESS
[FreeTextEntry1] : f/u breast mass [de-identified] : 42 yo F with hx of fatty liver, HLD, presenting for follow up of R breast mass. Says she does not feel the discomfort or mass in her breast anymore. MEETA-7 score 14; she says its because she has body pains including complains that she has been having left sided headaches for the past 4 days that is relieved with Tylenol, but returns. Also reports long standing stomach discomfort. She describes her pain as a discomfort of gasses that is present until she burps.

## 2023-12-21 NOTE — INTERPRETER SERVICES
[[employer] ] : provided by [employer]   [Interpreters_IDNumber] : [id number] [Interpreters_FullName] : Al [TWNoteComboBox1] : [place]

## 2024-01-03 NOTE — ED ADULT NURSE NOTE - PMH
[facility] EMERGENCY DEPARTMENT  EMERGENCY DEPARTMENT ENCOUNTER        Pt Name: Asiya Mendez  MRN: [id number]  Birthdate 1952  Date of evaluation: 1/3/2024  Provider: Chano Sharp DO  PCP: ADRIANA Rush MD  Note Started: 4:21 PM EST 1/3/24    CHIEF COMPLAINT       Chief Complaint   Patient presents with    Shortness of Breath     Few days SOB cough about 2 weeks hx COPD       HISTORY OF PRESENT ILLNESS: 1 or more Elements     History from : Patient    Limitations to history : None    Asiya Mendez is a 71 y.o. female who presents for shortness of breath. Pt notes sx for several weeks. Pt thinks she has pneumonia. She is coughing with productive thick white phlegm. Pt notes intermittent wheezing. Pt notes multiple grand children who have been sick. Pt has tried nebulizer treatments, steroids which she finished a few days ago. Pt has not been on antibiotics. Pt had a home covid test which was negative. Pt is not vaccinated for flu or covid.  Grand twins were in a  w rsv. -120. No history of afib. Pt notes temp today was 99. Pt denies any n/v/d. Pt notes intermittent sob, worse w exertion and walking. Cp not worse w anything./ pt on 2l o2 at home but turning it up. Pcp erica rush. Pt is not on blood thinners    Nursing Notes were all reviewed and agreed with or any disagreements were addressed in the HPI.    REVIEW OF SYSTEMS :      Review of Systems   Constitutional:  Positive for fatigue.   HENT:  Positive for congestion.    Eyes:  Negative for photophobia and visual disturbance.   Respiratory:  Positive for cough and shortness of breath.    Cardiovascular:  Positive for palpitations. Negative for chest pain.   Gastrointestinal:  Negative for abdominal pain, diarrhea and nausea.   Endocrine: Negative for polyphagia and polyuria.   Genitourinary:  Negative for difficulty urinating and dysuria.   Musculoskeletal:  Negative for back  pain and neck pain.   Neurological:  Negative for dizziness, syncope, speech difficulty and headaches.       Positives and Pertinent negatives as per HPI.     SURGICAL HISTORY     Past Surgical History:   Procedure Laterality Date     SECTION         CURRENTMEDICATIONS       Previous Medications    ALBUTEROL SULFATE HFA (VENTOLIN HFA) 108 (90 BASE) MCG/ACT INHALER    Inhale 2 puffs into the lungs every 4 hours as needed for Wheezing    AMLODIPINE (NORVASC) 5 MG TABLET    Take 1 tablet by mouth every morning    CPAP MACHINE MISC    Inhale into the lungs nightly    FLUTICASONE-UMECLIDIN-VILANT (TRELEGY ELLIPTA) 200-62.5-25 MCG/ACT AEPB INHALER    Inhale 1 puff into the lungs every morning    LISINOPRIL (PRINIVIL;ZESTRIL) 20 MG TABLET    Take 1 tablet by mouth every morning    LORATADINE (CLARITIN) 10 MG CAPSULE    Take 1 capsule by mouth every morning    METFORMIN (GLUCOPHAGE) 1000 MG TABLET    Take 1 tablet by mouth 2 times daily (with meals)    OMEPRAZOLE (PRILOSEC) 20 MG DELAYED RELEASE CAPSULE    Take 1 capsule by mouth every morning    OXYGEN    Inhale 2 L/min into the lungs continuous    SERTRALINE (ZOLOFT) 50 MG TABLET    Take 1 tablet by mouth every morning    SIMVASTATIN (ZOCOR) 20 MG TABLET    Take 1 tablet by mouth every morning       ALLERGIES     Aspirin    FAMILYHISTORY       Family History   Problem Relation Age of Onset    Cancer Mother         breast ??    COPD Father         SOCIAL HISTORY       Social History     Tobacco Use    Smoking status: Former     Current packs/day: 0.00     Average packs/day: 1 pack/day for 41.0 years (41.0 ttl pk-yrs)     Types: Cigarettes     Start date:      Quit date: 2012     Years since quittin.0    Smokeless tobacco: Never   Vaping Use    Vaping Use: Never used   Substance Use Topics    Alcohol use: Not Currently    Drug use: Never       SCREENINGS        [place] Coma Scale  Eye Opening: Spontaneous  Best Verbal Response: Oriented  Best Motor  High blood pressure

## 2024-01-25 RX ORDER — LISINOPRIL 20 MG/1
20 TABLET ORAL
Qty: 90 | Refills: 3 | Status: ACTIVE | COMMUNITY
Start: 2020-07-21 | End: 1900-01-01

## 2024-01-30 ENCOUNTER — OUTPATIENT (OUTPATIENT)
Dept: OUTPATIENT SERVICES | Facility: HOSPITAL | Age: 44
LOS: 1 days | End: 2024-01-30
Payer: SELF-PAY

## 2024-01-30 DIAGNOSIS — Z98.890 OTHER SPECIFIED POSTPROCEDURAL STATES: Chronic | ICD-10-CM

## 2024-01-30 DIAGNOSIS — R07.89 OTHER CHEST PAIN: ICD-10-CM

## 2024-01-30 DIAGNOSIS — Z98.891 HISTORY OF UTERINE SCAR FROM PREVIOUS SURGERY: Chronic | ICD-10-CM

## 2024-01-30 DIAGNOSIS — Z00.00 ENCOUNTER FOR GENERAL ADULT MEDICAL EXAMINATION WITHOUT ABNORMAL FINDINGS: ICD-10-CM

## 2024-01-30 PROCEDURE — 93017 CV STRESS TEST TRACING ONLY: CPT

## 2024-01-30 PROCEDURE — 93306 TTE W/DOPPLER COMPLETE: CPT

## 2024-01-30 PROCEDURE — 93016 CV STRESS TEST SUPVJ ONLY: CPT

## 2024-01-30 PROCEDURE — 93018 CV STRESS TEST I&R ONLY: CPT

## 2024-01-30 PROCEDURE — 93306 TTE W/DOPPLER COMPLETE: CPT | Mod: 26

## 2024-03-15 ENCOUNTER — APPOINTMENT (OUTPATIENT)
Dept: ULTRASOUND IMAGING | Facility: HOSPITAL | Age: 44
End: 2024-03-15
Payer: SELF-PAY

## 2024-03-15 ENCOUNTER — OUTPATIENT (OUTPATIENT)
Dept: OUTPATIENT SERVICES | Facility: HOSPITAL | Age: 44
LOS: 1 days | End: 2024-03-15
Payer: SELF-PAY

## 2024-03-15 ENCOUNTER — RESULT REVIEW (OUTPATIENT)
Age: 44
End: 2024-03-15

## 2024-03-15 DIAGNOSIS — Z98.891 HISTORY OF UTERINE SCAR FROM PREVIOUS SURGERY: Chronic | ICD-10-CM

## 2024-03-15 DIAGNOSIS — N60.01 SOLITARY CYST OF RIGHT BREAST: ICD-10-CM

## 2024-03-15 DIAGNOSIS — R92.2 INCONCLUSIVE MAMMOGRAM: ICD-10-CM

## 2024-03-15 DIAGNOSIS — Z98.890 OTHER SPECIFIED POSTPROCEDURAL STATES: Chronic | ICD-10-CM

## 2024-03-15 PROCEDURE — 76641 ULTRASOUND BREAST COMPLETE: CPT

## 2024-03-15 PROCEDURE — 76641 ULTRASOUND BREAST COMPLETE: CPT | Mod: 26,50

## 2024-04-12 ENCOUNTER — EMERGENCY (EMERGENCY)
Facility: HOSPITAL | Age: 44
LOS: 1 days | Discharge: HOME | End: 2024-04-12
Attending: EMERGENCY MEDICINE | Admitting: EMERGENCY MEDICINE
Payer: MEDICAID

## 2024-04-12 VITALS
SYSTOLIC BLOOD PRESSURE: 141 MMHG | WEIGHT: 151.9 LBS | OXYGEN SATURATION: 98 % | TEMPERATURE: 98 F | HEIGHT: 55 IN | RESPIRATION RATE: 18 BRPM | DIASTOLIC BLOOD PRESSURE: 84 MMHG | HEART RATE: 84 BPM

## 2024-04-12 DIAGNOSIS — Z98.890 OTHER SPECIFIED POSTPROCEDURAL STATES: Chronic | ICD-10-CM

## 2024-04-12 DIAGNOSIS — Z98.891 HISTORY OF UTERINE SCAR FROM PREVIOUS SURGERY: Chronic | ICD-10-CM

## 2024-04-12 PROCEDURE — 96372 THER/PROPH/DIAG INJ SC/IM: CPT

## 2024-04-12 PROCEDURE — 99284 EMERGENCY DEPT VISIT MOD MDM: CPT

## 2024-04-12 PROCEDURE — 99283 EMERGENCY DEPT VISIT LOW MDM: CPT

## 2024-04-12 RX ORDER — METHOCARBAMOL 500 MG/1
2 TABLET, FILM COATED ORAL
Qty: 24 | Refills: 0
Start: 2024-04-12 | End: 2024-04-14

## 2024-04-12 RX ORDER — ACETAMINOPHEN 500 MG
975 TABLET ORAL ONCE
Refills: 0 | Status: COMPLETED | OUTPATIENT
Start: 2024-04-12 | End: 2024-04-12

## 2024-04-12 RX ORDER — KETOROLAC TROMETHAMINE 30 MG/ML
30 SYRINGE (ML) INJECTION ONCE
Refills: 0 | Status: DISCONTINUED | OUTPATIENT
Start: 2024-04-12 | End: 2024-04-12

## 2024-04-12 RX ORDER — DIAZEPAM 5 MG
5 TABLET ORAL ONCE
Refills: 0 | Status: DISCONTINUED | OUTPATIENT
Start: 2024-04-12 | End: 2024-04-12

## 2024-04-12 RX ADMIN — Medication 975 MILLIGRAM(S): at 03:26

## 2024-04-12 RX ADMIN — Medication 30 MILLIGRAM(S): at 03:27

## 2024-04-12 RX ADMIN — Medication 5 MILLIGRAM(S): at 02:57

## 2024-04-12 RX ADMIN — Medication 30 MILLIGRAM(S): at 02:57

## 2024-04-12 RX ADMIN — Medication 975 MILLIGRAM(S): at 02:56

## 2024-04-12 NOTE — ED ADULT NURSE NOTE - NSFALLUNIVINTERV_ED_ALL_ED
Bed/Stretcher in lowest position, wheels locked, appropriate side rails in place/Call bell, personal items and telephone in reach/Instruct patient to call for assistance before getting out of bed/chair/stretcher/Non-slip footwear applied when patient is off stretcher/[place] to call system/Physically safe environment - no spills, clutter or unnecessary equipment/Purposeful proactive rounding/Room/bathroom lighting operational, light cord in reach

## 2024-04-12 NOTE — ED PROVIDER NOTE - OBJECTIVE STATEMENT
Patient complaining of left upper back pain that radiates around to her chest and is worse with any movement.  Patient states it also hurts with taking a deep breath.  Patient states that it feels like "nails going into t her back.  Turning twisting and bending all hurt.  Pain is much better when she is sitting still.  Denies any shortness of breath, fever, cough, vomiting

## 2024-04-12 NOTE — ED PROVIDER NOTE - CLINICAL SUMMARY MEDICAL DECISION MAKING FREE TEXT BOX
pt with msk upper back pain, improved with toradol and muscle relaxer, stable for dc pt with msk upper back pain, no trauma, improved with toradol and muscle relaxer, stable for dc

## 2024-04-12 NOTE — ED PROVIDER NOTE - NSFOLLOWUPINSTRUCTIONS_ED_ALL_ED_FT
-- You should update your primary care physician on your Emergency Department visit and follow up with them.  If you do not have a physician or have difficulty following up, please call: [phone] to obtain a [facility] doctor or specialist who can provide follow up.    -- Take ibuprofen 400 mg every 6 hours with food, as needed for pain    -- Take tylenol 650 mg every 4 hours, as needed for pain    -- Return to the ER for worsening or persistent symptoms, and/or ANY NEW OR CONCERNING SYMPTOMS. -- You should update your primary care physician on your Emergency Department visit and follow up with them.  If you do not have a physician or have difficulty following up, please call: [phone] to obtain a [facility] doctor or specialist who can provide follow up.    -- Take ibuprofen 400 mg every 6 hours with food, as needed for pain    -- Take tylenol 650 mg every 4 hours, as needed for pain    -- Muscle relaxer prescribed, take as directed.    -- Return to the ER for worsening or persistent symptoms, and/or ANY NEW OR CONCERNING SYMPTOMS.

## 2024-04-12 NOTE — ED PROVIDER NOTE - PHYSICAL EXAMINATION
Gen: alert, NAD  HEENT:  NC/AT, PERR  CV:  well perfused  Pulm:  normal RR, breathing comfortably  Abd: s/nt/nd  MSK: moving all extremities, ttp to the L upper paraspinal muscles  Neuro:  non-focal  Skin:  visualized areas intact  Psych: AOx3

## 2024-04-12 NOTE — ED PROVIDER NOTE - PATIENT PORTAL LINK FT
You can access the FollowMyHealth Patient Portal offered by [facility] by registering at the following website: [email or web address]. By joining [employer]’s FollowMyHealth portal, you will also be able to view your health information using other applications (apps) compatible with our system.

## 2024-04-12 NOTE — ED ADULT TRIAGE NOTE - CHIEF COMPLAINT QUOTE
pt. c/o back pain radiating to front started yesterday, yesterday she had pain in the left arm, dizziness , denies V/fever

## 2024-08-07 ENCOUNTER — APPOINTMENT (OUTPATIENT)
Dept: FAMILY MEDICINE | Facility: HOSPITAL | Age: 44
End: 2024-08-07

## 2024-08-07 ENCOUNTER — OUTPATIENT (OUTPATIENT)
Dept: OUTPATIENT SERVICES | Facility: HOSPITAL | Age: 44
LOS: 1 days | End: 2024-08-07
Payer: SELF-PAY

## 2024-08-07 DIAGNOSIS — H57.89 OTHER SPECIFIED DISORDERS OF EYE AND ADNEXA: ICD-10-CM

## 2024-08-07 DIAGNOSIS — Z98.891 HISTORY OF UTERINE SCAR FROM PREVIOUS SURGERY: Chronic | ICD-10-CM

## 2024-08-07 DIAGNOSIS — Z98.890 OTHER SPECIFIED POSTPROCEDURAL STATES: Chronic | ICD-10-CM

## 2024-08-07 DIAGNOSIS — Z00.00 ENCOUNTER FOR GENERAL ADULT MEDICAL EXAMINATION WITHOUT ABNORMAL FINDINGS: ICD-10-CM

## 2024-08-07 PROCEDURE — G0463: CPT

## 2024-08-08 PROBLEM — D31.91: Status: ACTIVE | Noted: 2024-08-07

## 2024-08-14 NOTE — PHYSICAL EXAM
[Well Nourished] : well nourished [PERRL] : pupils equal round and reactive to light [EOMI] : extraocular movements intact [No Respiratory Distress] : no respiratory distress  [Clear to Auscultation] : lungs were clear to auscultation bilaterally [Normal Rate] : normal rate  [Normal S1, S2] : normal S1 and S2 [Soft] : abdomen soft [Non Tender] : non-tender [Non-distended] : non-distended [Normal Supraclavicular Nodes] : no supraclavicular lymphadenopathy [Normal Posterior Cervical Nodes] : no posterior cervical lymphadenopathy [Normal Anterior Cervical Nodes] : no anterior cervical lymphadenopathy [No Rash] : no rash [de-identified] : No scleral injection present, but patient has a scleral nevus of the right eye

## 2024-08-14 NOTE — REVIEW OF SYSTEMS
[Pain] : pain [Itching] : itching [Negative] : Neurological [Discharge] : no discharge [Redness] : no redness [Dryness] : no dryness  [Vision Problems] : no vision problems [FreeTextEntry3] : Burning of the right eye

## 2024-08-14 NOTE — PHYSICAL EXAM
[Well Nourished] : well nourished [PERRL] : pupils equal round and reactive to light [EOMI] : extraocular movements intact [No Respiratory Distress] : no respiratory distress  [Clear to Auscultation] : lungs were clear to auscultation bilaterally [Normal Rate] : normal rate  [Normal S1, S2] : normal S1 and S2 [Soft] : abdomen soft [Non Tender] : non-tender [Non-distended] : non-distended [Normal Supraclavicular Nodes] : no supraclavicular lymphadenopathy [Normal Posterior Cervical Nodes] : no posterior cervical lymphadenopathy [Normal Anterior Cervical Nodes] : no anterior cervical lymphadenopathy [No Rash] : no rash [de-identified] : No scleral injection present, but patient has a scleral nevus of the right eye

## 2024-08-14 NOTE — PHYSICAL EXAM
[Well Nourished] : well nourished [PERRL] : pupils equal round and reactive to light [EOMI] : extraocular movements intact [No Respiratory Distress] : no respiratory distress  [Clear to Auscultation] : lungs were clear to auscultation bilaterally [Normal Rate] : normal rate  [Normal S1, S2] : normal S1 and S2 [Soft] : abdomen soft [Non Tender] : non-tender [Non-distended] : non-distended [Normal Supraclavicular Nodes] : no supraclavicular lymphadenopathy [Normal Posterior Cervical Nodes] : no posterior cervical lymphadenopathy [Normal Anterior Cervical Nodes] : no anterior cervical lymphadenopathy [No Rash] : no rash [de-identified] : No scleral injection present, but patient has a scleral nevus of the right eye

## 2024-08-14 NOTE — PLAN
[FreeTextEntry1] : # Occular nevus  - patient never followed up with ophthalmology regarding this  - Ophthalmology referral provided   # Burning of the eye  - Pataday and ocusoft for symptomatic treatment   # The rest of the plan as mentioned above   RTC for management of other chronic conditions in the patient's chart   Case d/w Dr. King

## 2024-08-14 NOTE — HISTORY OF PRESENT ILLNESS
[FreeTextEntry8] : A 44-year-old female with a past medical history of hyperlipidemia and lung nodules presented today for an acute visit due to right eye pain, burning, itching, tearing, and twitching that has persisted for the past three weeks. The patient has not experienced any recent upper respiratory infections. Additionally, there is no reported case of conjunctivitis at her home. She exhibits no erythema or discharge from the eye. The patient also denies having fever or chills.

## 2024-08-23 ENCOUNTER — APPOINTMENT (OUTPATIENT)
Dept: FAMILY MEDICINE | Facility: HOSPITAL | Age: 44
End: 2024-08-23

## 2024-08-23 ENCOUNTER — OUTPATIENT (OUTPATIENT)
Dept: OUTPATIENT SERVICES | Facility: HOSPITAL | Age: 44
LOS: 1 days | End: 2024-08-23
Payer: SELF-PAY

## 2024-08-23 VITALS
OXYGEN SATURATION: 99 % | WEIGHT: 156 LBS | RESPIRATION RATE: 17 BRPM | SYSTOLIC BLOOD PRESSURE: 151 MMHG | TEMPERATURE: 98 F | HEART RATE: 71 BPM | BODY MASS INDEX: 28.53 KG/M2 | DIASTOLIC BLOOD PRESSURE: 83 MMHG

## 2024-08-23 DIAGNOSIS — I10 ESSENTIAL (PRIMARY) HYPERTENSION: ICD-10-CM

## 2024-08-23 DIAGNOSIS — Z98.890 OTHER SPECIFIED POSTPROCEDURAL STATES: Chronic | ICD-10-CM

## 2024-08-23 DIAGNOSIS — H57.89 OTHER SPECIFIED DISORDERS OF EYE AND ADNEXA: ICD-10-CM

## 2024-08-23 DIAGNOSIS — Z00.00 ENCOUNTER FOR GENERAL ADULT MEDICAL EXAMINATION WITHOUT ABNORMAL FINDINGS: ICD-10-CM

## 2024-08-23 DIAGNOSIS — R76.0 RAISED ANTIBODY TITER: ICD-10-CM

## 2024-08-23 DIAGNOSIS — Z98.891 HISTORY OF UTERINE SCAR FROM PREVIOUS SURGERY: Chronic | ICD-10-CM

## 2024-08-23 DIAGNOSIS — E78.5 HYPERLIPIDEMIA, UNSPECIFIED: ICD-10-CM

## 2024-08-23 DIAGNOSIS — K76.0 FATTY (CHANGE OF) LIVER, NOT ELSEWHERE CLASSIFIED: ICD-10-CM

## 2024-08-23 PROCEDURE — 80053 COMPREHEN METABOLIC PANEL: CPT

## 2024-08-23 PROCEDURE — G0463: CPT

## 2024-08-23 PROCEDURE — 80061 LIPID PANEL: CPT

## 2024-08-23 PROCEDURE — 85027 COMPLETE CBC AUTOMATED: CPT

## 2024-08-23 PROCEDURE — 84443 ASSAY THYROID STIM HORMONE: CPT

## 2024-08-23 PROCEDURE — 83036 HEMOGLOBIN GLYCOSYLATED A1C: CPT

## 2024-08-23 RX ORDER — HYDROCHLOROTHIAZIDE 12.5 MG/1
12.5 TABLET ORAL
Qty: 30 | Refills: 2 | Status: ACTIVE | COMMUNITY
Start: 2024-08-23 | End: 1900-01-01

## 2024-08-23 RX ORDER — MULTIVIT-MIN/FERROUS FUMARATE 9 MG/15 ML
0.05-0.1-1-1 LIQUID (ML) ORAL
Qty: 1 | Refills: 0 | Status: ACTIVE | COMMUNITY
Start: 2024-08-23 | End: 1900-01-01

## 2024-08-27 ENCOUNTER — OUTPATIENT (OUTPATIENT)
Dept: OUTPATIENT SERVICES | Facility: HOSPITAL | Age: 44
LOS: 1 days | End: 2024-08-27

## 2024-08-27 DIAGNOSIS — Z98.890 OTHER SPECIFIED POSTPROCEDURAL STATES: Chronic | ICD-10-CM

## 2024-08-27 DIAGNOSIS — E78.5 HYPERLIPIDEMIA, UNSPECIFIED: ICD-10-CM

## 2024-08-27 DIAGNOSIS — Z98.891 HISTORY OF UTERINE SCAR FROM PREVIOUS SURGERY: Chronic | ICD-10-CM

## 2024-08-28 ENCOUNTER — NON-APPOINTMENT (OUTPATIENT)
Age: 44
End: 2024-08-28

## 2024-08-28 LAB
ALBUMIN SERPL ELPH-MCNC: 4.5 G/DL
ALP BLD-CCNC: 72 U/L
ALT SERPL-CCNC: 15 U/L
ANION GAP SERPL CALC-SCNC: 13 MMOL/L
AST SERPL-CCNC: 15 U/L
BILIRUB SERPL-MCNC: 0.2 MG/DL
BUN SERPL-MCNC: 12 MG/DL
CALCIUM SERPL-MCNC: 8.9 MG/DL
CHLORIDE SERPL-SCNC: 104 MMOL/L
CHOLEST SERPL-MCNC: 202 MG/DL
CO2 SERPL-SCNC: 23 MMOL/L
CREAT SERPL-MCNC: 0.53 MG/DL
EGFR: 118 ML/MIN/1.73M2
ESTIMATED AVERAGE GLUCOSE: 108 MG/DL
GLUCOSE SERPL-MCNC: 92 MG/DL
HBA1C MFR BLD HPLC: 5.4 %
HCT VFR BLD CALC: 37.8 %
HDLC SERPL-MCNC: 42 MG/DL
HGB BLD-MCNC: 12.2 G/DL
LDLC SERPL CALC-MCNC: 144 MG/DL
MCHC RBC-ENTMCNC: 27.4 PG
MCHC RBC-ENTMCNC: 32.3 GM/DL
MCV RBC AUTO: 84.8 FL
NONHDLC SERPL-MCNC: 159 MG/DL
PLATELET # BLD AUTO: 319 K/UL
POTASSIUM SERPL-SCNC: 4.2 MMOL/L
PROT SERPL-MCNC: 7 G/DL
RBC # BLD: 4.46 M/UL
RBC # FLD: 14.6 %
SODIUM SERPL-SCNC: 140 MMOL/L
TRIGL SERPL-MCNC: 83 MG/DL
TSH SERPL-ACNC: 1 UIU/ML
WBC # FLD AUTO: 5.62 K/UL

## 2024-08-29 ENCOUNTER — NON-APPOINTMENT (OUTPATIENT)
Age: 44
End: 2024-08-29

## 2024-08-30 NOTE — HISTORY OF PRESENT ILLNESS
[FreeTextEntry1] : eye discomfort [de-identified] : 43 yo with hx of HTN, lung nodules, HTN, fatty liver,  presenting for follow up of eye pain, She was unable to get an ophthalmology appointment because they did not take her discount. She also was not able to get the eye drops because the pharmacy said they dont have it. She still has the eye discomfort in her right eye with blurry vision.

## 2024-08-30 NOTE — PHYSICAL EXAM
[Normal] : no acute distress, well nourished, well developed and well-appearing [PERRL] : pupils equal round and reactive to light [Normal Affect] : the affect was normal [Normal Insight/Judgement] : insight and judgment were intact [de-identified] : nevus in right eye (chronic). no lesions, no scleral erythema, no pain with eye movement

## 2024-08-30 NOTE — INTERPRETER SERVICES
[[employer] ] : provided by [employer]   [Interpreters_IDNumber] : [id number] [Interpreters_FullName] : Norma [[email or web address]] : Norwegian

## 2024-08-30 NOTE — INTERPRETER SERVICES
[[employer] ] : provided by [employer]   [Interpreters_IDNumber] : [id number] [Interpreters_FullName] : Norma [TWNoteComboBox1] : Hungarian

## 2024-08-30 NOTE — PHYSICAL EXAM
[Normal] : no acute distress, well nourished, well developed and well-appearing [PERRL] : pupils equal round and reactive to light [Normal Affect] : the affect was normal [Normal Insight/Judgement] : insight and judgment were intact [de-identified] : nevus in right eye (chronic). no lesions, no scleral erythema, no pain with eye movement

## 2024-08-30 NOTE — HISTORY OF PRESENT ILLNESS
[FreeTextEntry1] : eye discomfort [de-identified] : 43 yo with hx of HTN, lung nodules, HTN, fatty liver,  presenting for follow up of eye pain, She was unable to get an ophthalmology appointment because they did not take her discount. She also was not able to get the eye drops because the pharmacy said they dont have it. She still has the eye discomfort in her right eye with blurry vision.

## 2024-08-30 NOTE — HISTORY OF PRESENT ILLNESS
[FreeTextEntry1] : eye discomfort [de-identified] : 45 yo with hx of HTN, lung nodules, HTN, fatty liver,  presenting for follow up of eye pain, She was unable to get an ophthalmology appointment because they did not take her discount. She also was not able to get the eye drops because the pharmacy said they dont have it. She still has the eye discomfort in her right eye with blurry vision.

## 2024-08-30 NOTE — INTERPRETER SERVICES
[[employer] ] : provided by [employer]   [Interpreters_IDNumber] : [id number] [Interpreters_FullName] : Norma [TWNoteComboBox1] : [place]

## 2024-08-30 NOTE — PHYSICAL EXAM
[Normal] : no acute distress, well nourished, well developed and well-appearing [PERRL] : pupils equal round and reactive to light [Normal Affect] : the affect was normal [Normal Insight/Judgement] : insight and judgment were intact [de-identified] : nevus in right eye (chronic). no lesions, no scleral erythema, no pain with eye movement

## 2024-08-30 NOTE — HISTORY OF PRESENT ILLNESS
[FreeTextEntry1] : eye discomfort [de-identified] : 45 yo with hx of HTN, lung nodules, HTN, fatty liver,  presenting for follow up of eye pain, She was unable to get an ophthalmology appointment because they did not take her discount. She also was not able to get the eye drops because the pharmacy said they dont have it. She still has the eye discomfort in her right eye with blurry vision.

## 2024-08-30 NOTE — INTERPRETER SERVICES
[[employer] ] : provided by [employer]   [Interpreters_IDNumber] : [id number] [Interpreters_FullName] : Norma [[email or web address]] : Serbian

## 2024-08-30 NOTE — PHYSICAL EXAM
[Normal] : no acute distress, well nourished, well developed and well-appearing [PERRL] : pupils equal round and reactive to light [Normal Affect] : the affect was normal [Normal Insight/Judgement] : insight and judgment were intact [de-identified] : nevus in right eye (chronic). no lesions, no scleral erythema, no pain with eye movement

## 2024-09-13 ENCOUNTER — NON-APPOINTMENT (OUTPATIENT)
Age: 44
End: 2024-09-13

## 2024-10-07 ENCOUNTER — NON-APPOINTMENT (OUTPATIENT)
Age: 44
End: 2024-10-07

## 2024-10-10 ENCOUNTER — APPOINTMENT (OUTPATIENT)
Dept: FAMILY MEDICINE | Facility: HOSPITAL | Age: 44
End: 2024-10-10

## 2024-10-10 ENCOUNTER — OUTPATIENT (OUTPATIENT)
Dept: OUTPATIENT SERVICES | Facility: HOSPITAL | Age: 44
LOS: 1 days | End: 2024-10-10
Payer: COMMERCIAL

## 2024-10-10 ENCOUNTER — OUTPATIENT (OUTPATIENT)
Dept: OUTPATIENT SERVICES | Facility: HOSPITAL | Age: 44
LOS: 1 days | End: 2024-10-10
Payer: SELF-PAY

## 2024-10-10 VITALS
WEIGHT: 157 LBS | DIASTOLIC BLOOD PRESSURE: 71 MMHG | TEMPERATURE: 98.2 F | OXYGEN SATURATION: 98 % | RESPIRATION RATE: 16 BRPM | SYSTOLIC BLOOD PRESSURE: 134 MMHG | HEART RATE: 81 BPM | BODY MASS INDEX: 28.72 KG/M2

## 2024-10-10 DIAGNOSIS — I10 ESSENTIAL (PRIMARY) HYPERTENSION: ICD-10-CM

## 2024-10-10 DIAGNOSIS — N63.41 UNSPECIFIED LUMP IN RIGHT BREAST, SUBAREOLAR: ICD-10-CM

## 2024-10-10 DIAGNOSIS — H57.89 OTHER SPECIFIED DISORDERS OF EYE AND ADNEXA: ICD-10-CM

## 2024-10-10 DIAGNOSIS — Z87.19 PERSONAL HISTORY OF OTHER DISEASES OF THE DIGESTIVE SYSTEM: ICD-10-CM

## 2024-10-10 DIAGNOSIS — R91.1 SOLITARY PULMONARY NODULE: ICD-10-CM

## 2024-10-10 DIAGNOSIS — E78.5 HYPERLIPIDEMIA, UNSPECIFIED: ICD-10-CM

## 2024-10-10 DIAGNOSIS — Z00.00 ENCOUNTER FOR GENERAL ADULT MEDICAL EXAMINATION WITHOUT ABNORMAL FINDINGS: ICD-10-CM

## 2024-10-10 DIAGNOSIS — Z12.39 ENCOUNTER FOR OTHER SCREENING FOR MALIGNANT NEOPLASM OF BREAST: ICD-10-CM

## 2024-10-10 DIAGNOSIS — D31.91 BENIGN NEOPLASM OF UNSPECIFIED PART OF RIGHT EYE: ICD-10-CM

## 2024-10-10 DIAGNOSIS — Z98.891 HISTORY OF UTERINE SCAR FROM PREVIOUS SURGERY: Chronic | ICD-10-CM

## 2024-10-10 DIAGNOSIS — Z00.00 ENCOUNTER FOR GENERAL ADULT MEDICAL EXAMINATION W/OUT ABNORMAL FINDINGS: ICD-10-CM

## 2024-10-10 DIAGNOSIS — Z12.11 ENCOUNTER FOR SCREENING FOR MALIGNANT NEOPLASM OF COLON: ICD-10-CM

## 2024-10-10 DIAGNOSIS — Z98.890 OTHER SPECIFIED POSTPROCEDURAL STATES: Chronic | ICD-10-CM

## 2024-10-10 DIAGNOSIS — N63.10 UNSPECIFIED LUMP IN THE RIGHT BREAST, UNSPECIFIED QUADRANT: ICD-10-CM

## 2024-10-10 DIAGNOSIS — R92.2 INCONCLUSIVE MAMMOGRAM: ICD-10-CM

## 2024-10-10 DIAGNOSIS — Z86.39 PERSONAL HISTORY OF OTHER ENDOCRINE, NUTRITIONAL AND METABOLIC DISEASE: ICD-10-CM

## 2024-10-10 DIAGNOSIS — R07.89 OTHER CHEST PAIN: ICD-10-CM

## 2024-10-10 DIAGNOSIS — R06.02 SHORTNESS OF BREATH: ICD-10-CM

## 2024-10-10 DIAGNOSIS — R14.0 ABDOMINAL DISTENSION (GASEOUS): ICD-10-CM

## 2024-10-10 PROCEDURE — G0463: CPT

## 2024-10-21 NOTE — ED ADULT NURSE NOTE - NSSUHOSCREENINGYN_ED_ALL_ED
PRINCIPAL DISCHARGE DIAGNOSIS  Diagnosis: Atrial fibrillation  Assessment and Plan of Treatment: You came to the hospital because you were feeling more lightheaded and had difficutly walking. When you came to the ED you were found to have a new abnormal heart rhythm called atrial fibrillation. You were given metoprolol to control the rhythm. You will be started on eliquis for your a-fib and need to follow up with your cardiologist. You got a TTE echo that had concerning findings for a possible vegetation on one of your valves, however, a RAH echo was not done becasue you had three negative blood cultures and no other symptoms with a low DUKES score. You had a CTA that showed a pulmonary embolism that appears to be chronic in nature, Dr. Sweet was notifed and it was deceidewd you did not need more anticoagulants. You were also treated for a UTI with an abx regiment that [place] completed. Your sodium was also low but has since come back up to a normal level.   PLease follow up with your doctors within two weeks.      SECONDARY DISCHARGE DIAGNOSES  Diagnosis: Elevated troponin  Assessment and Plan of Treatment:     Diagnosis: Atrial flutter  Assessment and Plan of Treatment:     Diagnosis: AAA (abdominal aortic aneurysm)  Assessment and Plan of Treatment:     Diagnosis: Dizziness  Assessment and Plan of Treatment:     
Yes - the patient is able to be screened

## 2024-11-06 ENCOUNTER — RESULT REVIEW (OUTPATIENT)
Age: 44
End: 2024-11-06

## 2024-11-11 ENCOUNTER — OUTPATIENT (OUTPATIENT)
Dept: OUTPATIENT SERVICES | Facility: HOSPITAL | Age: 44
LOS: 1 days | End: 2024-11-11
Payer: SELF-PAY

## 2024-11-11 ENCOUNTER — APPOINTMENT (OUTPATIENT)
Dept: MAMMOGRAPHY | Facility: HOSPITAL | Age: 44
End: 2024-11-11
Payer: SELF-PAY

## 2024-11-11 ENCOUNTER — RESULT REVIEW (OUTPATIENT)
Age: 44
End: 2024-11-11

## 2024-11-11 DIAGNOSIS — E78.5 HYPERLIPIDEMIA, UNSPECIFIED: ICD-10-CM

## 2024-11-11 DIAGNOSIS — Z98.890 OTHER SPECIFIED POSTPROCEDURAL STATES: Chronic | ICD-10-CM

## 2024-11-11 DIAGNOSIS — Z12.39 ENCOUNTER FOR OTHER SCREENING FOR MALIGNANT NEOPLASM OF BREAST: ICD-10-CM

## 2024-11-11 DIAGNOSIS — Z98.891 HISTORY OF UTERINE SCAR FROM PREVIOUS SURGERY: Chronic | ICD-10-CM

## 2024-11-11 PROCEDURE — 77067 SCR MAMMO BI INCL CAD: CPT

## 2024-11-11 PROCEDURE — 77063 BREAST TOMOSYNTHESIS BI: CPT

## 2024-11-11 PROCEDURE — 77067 SCR MAMMO BI INCL CAD: CPT | Mod: 26

## 2024-11-11 PROCEDURE — 77063 BREAST TOMOSYNTHESIS BI: CPT | Mod: 26

## 2024-11-22 ENCOUNTER — RESULT REVIEW (OUTPATIENT)
Age: 44
End: 2024-11-22

## 2024-11-22 ENCOUNTER — EMERGENCY (EMERGENCY)
Facility: HOSPITAL | Age: 44
LOS: 1 days | Discharge: HOME | End: 2024-11-22
Attending: STUDENT IN AN ORGANIZED HEALTH CARE EDUCATION/TRAINING PROGRAM | Admitting: STUDENT IN AN ORGANIZED HEALTH CARE EDUCATION/TRAINING PROGRAM
Payer: MEDICAID

## 2024-11-22 ENCOUNTER — APPOINTMENT (OUTPATIENT)
Dept: ULTRASOUND IMAGING | Facility: HOSPITAL | Age: 44
End: 2024-11-22
Payer: SELF-PAY

## 2024-11-22 ENCOUNTER — APPOINTMENT (OUTPATIENT)
Dept: MAMMOGRAPHY | Facility: HOSPITAL | Age: 44
End: 2024-11-22
Payer: SELF-PAY

## 2024-11-22 ENCOUNTER — OUTPATIENT (OUTPATIENT)
Dept: OUTPATIENT SERVICES | Facility: HOSPITAL | Age: 44
LOS: 1 days | End: 2024-11-22
Payer: SELF-PAY

## 2024-11-22 VITALS
RESPIRATION RATE: 18 BRPM | TEMPERATURE: 98 F | OXYGEN SATURATION: 96 % | DIASTOLIC BLOOD PRESSURE: 92 MMHG | WEIGHT: 156.97 LBS | SYSTOLIC BLOOD PRESSURE: 162 MMHG | HEIGHT: 63 IN | HEART RATE: 97 BPM

## 2024-11-22 DIAGNOSIS — Z98.891 HISTORY OF UTERINE SCAR FROM PREVIOUS SURGERY: Chronic | ICD-10-CM

## 2024-11-22 DIAGNOSIS — Z98.890 OTHER SPECIFIED POSTPROCEDURAL STATES: Chronic | ICD-10-CM

## 2024-11-22 DIAGNOSIS — R92.2 INCONCLUSIVE MAMMOGRAM: ICD-10-CM

## 2024-11-22 DIAGNOSIS — Z00.8 ENCOUNTER FOR OTHER GENERAL EXAMINATION: ICD-10-CM

## 2024-11-22 PROCEDURE — 99283 EMERGENCY DEPT VISIT LOW MDM: CPT

## 2024-11-22 PROCEDURE — G0279: CPT | Mod: 26

## 2024-11-22 PROCEDURE — 76641 ULTRASOUND BREAST COMPLETE: CPT

## 2024-11-22 PROCEDURE — G0279: CPT

## 2024-11-22 PROCEDURE — 77065 DX MAMMO INCL CAD UNI: CPT

## 2024-11-22 PROCEDURE — 76641 ULTRASOUND BREAST COMPLETE: CPT | Mod: 26

## 2024-11-22 PROCEDURE — 99284 EMERGENCY DEPT VISIT MOD MDM: CPT

## 2024-11-22 PROCEDURE — 77065 DX MAMMO INCL CAD UNI: CPT | Mod: 26,RT

## 2024-11-22 RX ORDER — POVIDONE-IODINE 0.07 MG/ML
1 SOLUTION TOPICAL
Qty: 2 | Refills: 1
Start: 2024-11-22 | End: 2024-11-27

## 2024-11-22 RX ORDER — AMOXICILLIN AND CLAVULANATE POTASSIUM 600; 42.9 MG/5ML; MG/5ML
875 POWDER, FOR SUSPENSION ORAL
Qty: 14 | Refills: 0
Start: 2024-11-22 | End: 2024-11-28

## 2024-11-22 RX ORDER — AMOXICILLIN AND CLAVULANATE POTASSIUM 600; 42.9 MG/5ML; MG/5ML
1 POWDER, FOR SUSPENSION ORAL ONCE
Refills: 0 | Status: COMPLETED | OUTPATIENT
Start: 2024-11-22 | End: 2024-11-22

## 2024-11-22 RX ADMIN — AMOXICILLIN AND CLAVULANATE POTASSIUM 1 TABLET(S): 600; 42.9 POWDER, FOR SUSPENSION ORAL at 17:25

## 2024-11-22 NOTE — ED PROVIDER NOTE - NSFOLLOWUPINSTRUCTIONS_ED_ALL_ED_FT
Descanse, debbi muchos líquidos.  Avanzar en la actividad según se tolere.  Continúe con todos los medicamentos recetados anteriormente según las indicaciones.  Kasandra un seguimiento con berkowitz PMD 2-3 días y traiga copias de marvel resultados.  Regrese a la sasha de emergencias si los síntomas empeoran, fiebre, secreción amarilla, aumento del dolor, aumento del enrojecimiento, aumento de la hinchazón o nuevos síntomas preocupantes.    Kasandra un seguimiento con la cirugía de mano del Dr. Hardwick en 2 a 5 april para hung evaluación adicional.    Jabón tibio con agua en remojo dos veces al día.      Rest, drink plenty of fluids.  Advance activity as tolerated.  Continue all previously prescribed medications as directed.  Follow up with your PMD 2-3 days and bring copies of your results.  Return to the ER for worsening symptoms, fevers, yellow drainage, increased pain, increased redness, increased swelling or new concerning symptoms.    Follow up with Dr. Hardwick Hand surgery in 2-5 days for further evaluation.    Warm soap with water soaks twice daily.

## 2024-11-22 NOTE — ED PROVIDER NOTE - PATIENT PORTAL LINK FT
You can access the FollowMyHealth Patient Portal offered by [facility] by registering at the following website: [email or web address]. By joining [employer]’s FollowMyHealth portal, you will also be able to view your health information using other applications (apps) compatible with our system.

## 2024-11-22 NOTE — ED ADULT NURSE NOTE - NSFALLUNIVINTERV_ED_ALL_ED
Bed/Stretcher in lowest position, wheels locked, appropriate side rails in place/Call bell, personal items and telephone in reach/Instruct patient to call for assistance before getting out of bed/chair/stretcher/Non-slip footwear applied when patient is off stretcher/[place] to call system/Physically safe environment - no spills, clutter or unnecessary equipment/Purposeful proactive rounding/Room/bathroom lighting operational, light cord in reach

## 2024-11-22 NOTE — ED PROVIDER NOTE - NSFOLLOWUPCLINICS_GEN_ALL_ED_FT
[facility]  Plastic Surgery  [address], [address]  [place] [address]  Phone: [phone]  Fax:

## 2024-11-22 NOTE — ED PROVIDER NOTE - CARE PROVIDER_API CALL
Minesh Hardwick  Plastic Surgery  [address] [place] [address], [address]  [place] [address]  Phone: [phone]  Fax: [phone]  Follow Up Time:

## 2024-11-22 NOTE — ED PROVIDER NOTE - PHYSICAL EXAMINATION
VITAL SIGNS: I have reviewed nursing notes and confirm.   GEN: Well-developed; well-nourished; in no acute distress. Speaking full sentences.  SKIN: Warm, pink, no rash, no diaphoresis, no cyanosis, well perfused.   HEAD: Normocephalic; atraumatic.    NECK: Supple; non tender. Full range of motion.   MSK: Normal range of motion and movement of all 4 extremities. No apparent joint or muscular pain throughout.  (+) RIGHT HAND: HAND: Sensation: SILT in FF/IF dorsal, proximal (radial), SF tip (ulnar), IF volar tip (median). Motor: (+) thumbs UP (radial), OK sign (median), and "V-sign"-with 2nd 3rd fingers ulnar intact. Flexion and extension 1-5 against resistance intact, wrist and finger extension off table (radial), finger [id number] (ulnar), Thumb to pinky (median) intact. Vascular: Capillary refill <2sec in all digits. Compartments soft. (+) RIGHT THUMB with linear lesion vertical on the lateral aspect. No protruding fb from the nailbed. No subungual hematoma. Mild ttp to the nail.   BACK: No thoracolumbar midline or paravertebral tenderness.    NEURO: Alert & oriented x 3, Grossly unremarkable. Sensory and motor intact throughout. No focal deficits.  Normal speech and coordination.

## 2024-11-22 NOTE — ED PROVIDER NOTE - OBJECTIVE STATEMENT
44-year-old female with no past medical history presenting with right thumb possible foreign body in underneath the nail after eating fish today.  she was cleaning a fish and may have had a fishbone lodged in her right thumb.  Associated with mild pain and was unable to remove the fishbone herself.  There is mild pain and throbbing to the thumb.  Otherwise there are no lacerations, abrasions, other extremity injuries, weakness, numbness tingling, subungual hematoma.

## 2024-11-22 NOTE — ED PROVIDER NOTE - CLINICAL SUMMARY MEDICAL DECISION MAKING FREE TEXT BOX
Dr. Rambo Mendoza MD, [facility] T[facility]ology Attendin-year-old female with no past medical history presenting with right thumb possible foreign body in underneath the nail after eating fish today.  she was cleaning a fish and may have had a fishbone lodged in her right thumb.  Associated with mild pain and was unable to remove the fishbone herself.  There is mild pain and throbbing to the thumb.  Otherwise there are no lacerations, abrasions, other extremity injuries, weakness, numbness tingling, subungual hematoma.  History obtained from independent historian: N/A  External note reviewed: N/A  DDx includes but is not limited to: fb in nail, nailbed injury,   Decision making, ED course, independent interpretation of imaging studies, and consults:   - Attempted to remove the FB but unable to lift nail and visualize the FB if any. Will tx with betadine diluted soaks and prophylaxis abx and f/u hand.     Consideration hospitalization vs de-escalation of care: dc  Disposition: DC

## 2024-11-22 NOTE — ED ADULT NURSE NOTE - OBJECTIVE STATEMENT
Patient presents to ED complaining of having a fishbone in her right thumb. Patient states it got into her thumb while working. Patient denies bleeding but states pain. Patient axox4, well appearing, skin cool warm unremarkable.

## 2024-12-04 DIAGNOSIS — R92.8 OTHER ABNORMAL AND INCONCLUSIVE FINDINGS ON DIAGNOSTIC IMAGING OF BREAST: ICD-10-CM

## 2024-12-13 ENCOUNTER — NON-APPOINTMENT (OUTPATIENT)
Age: 44
End: 2024-12-13

## 2024-12-20 ENCOUNTER — APPOINTMENT (OUTPATIENT)
Dept: FAMILY MEDICINE | Facility: HOSPITAL | Age: 44
End: 2024-12-20

## 2024-12-20 ENCOUNTER — OUTPATIENT (OUTPATIENT)
Dept: OUTPATIENT SERVICES | Facility: HOSPITAL | Age: 44
LOS: 1 days | End: 2024-12-20
Payer: SELF-PAY

## 2024-12-20 VITALS
RESPIRATION RATE: 16 BRPM | DIASTOLIC BLOOD PRESSURE: 83 MMHG | WEIGHT: 158 LBS | HEART RATE: 82 BPM | OXYGEN SATURATION: 99 % | TEMPERATURE: 97.9 F | BODY MASS INDEX: 28.9 KG/M2 | SYSTOLIC BLOOD PRESSURE: 143 MMHG

## 2024-12-20 DIAGNOSIS — Z98.890 OTHER SPECIFIED POSTPROCEDURAL STATES: Chronic | ICD-10-CM

## 2024-12-20 DIAGNOSIS — Z00.00 ENCOUNTER FOR GENERAL ADULT MEDICAL EXAMINATION WITHOUT ABNORMAL FINDINGS: ICD-10-CM

## 2024-12-20 DIAGNOSIS — Z98.891 HISTORY OF UTERINE SCAR FROM PREVIOUS SURGERY: Chronic | ICD-10-CM

## 2024-12-20 DIAGNOSIS — Z87.898 PERSONAL HISTORY OF OTHER SPECIFIED CONDITIONS: ICD-10-CM

## 2024-12-20 DIAGNOSIS — F45.8 OTHER SOMATOFORM DISORDERS: ICD-10-CM

## 2024-12-20 PROCEDURE — G0463: CPT

## 2024-12-20 RX ORDER — SIMETHICONE 125 MG/1
125 TABLET, CHEWABLE ORAL
Qty: 1 | Refills: 1 | Status: ACTIVE | COMMUNITY
Start: 2024-12-20 | End: 1900-01-01

## 2024-12-26 ENCOUNTER — OUTPATIENT (OUTPATIENT)
Dept: OUTPATIENT SERVICES | Facility: HOSPITAL | Age: 44
LOS: 1 days | End: 2024-12-26
Payer: SELF-PAY

## 2024-12-26 ENCOUNTER — RESULT REVIEW (OUTPATIENT)
Age: 44
End: 2024-12-26

## 2024-12-26 ENCOUNTER — NON-APPOINTMENT (OUTPATIENT)
Age: 44
End: 2024-12-26

## 2024-12-26 DIAGNOSIS — Z98.890 OTHER SPECIFIED POSTPROCEDURAL STATES: Chronic | ICD-10-CM

## 2024-12-26 DIAGNOSIS — Z98.891 HISTORY OF UTERINE SCAR FROM PREVIOUS SURGERY: Chronic | ICD-10-CM

## 2024-12-26 DIAGNOSIS — R10.13 EPIGASTRIC PAIN: ICD-10-CM

## 2024-12-26 DIAGNOSIS — F45.8 OTHER SOMATOFORM DISORDERS: ICD-10-CM

## 2024-12-26 PROCEDURE — 74176 CT ABD & PELVIS W/O CONTRAST: CPT

## 2024-12-26 PROCEDURE — 74176 CT ABD & PELVIS W/O CONTRAST: CPT | Mod: 26

## 2024-12-27 ENCOUNTER — NON-APPOINTMENT (OUTPATIENT)
Age: 44
End: 2024-12-27

## 2024-12-27 ENCOUNTER — APPOINTMENT (OUTPATIENT)
Dept: OPHTHALMOLOGY | Facility: CLINIC | Age: 44
End: 2024-12-27

## 2025-02-06 ENCOUNTER — APPOINTMENT (OUTPATIENT)
Age: 45
End: 2025-02-06

## 2025-02-06 ENCOUNTER — OUTPATIENT (OUTPATIENT)
Dept: OUTPATIENT SERVICES | Facility: HOSPITAL | Age: 45
LOS: 1 days | End: 2025-02-06
Payer: SELF-PAY

## 2025-02-06 VITALS
OXYGEN SATURATION: 98 % | RESPIRATION RATE: 17 BRPM | HEART RATE: 83 BPM | DIASTOLIC BLOOD PRESSURE: 70 MMHG | TEMPERATURE: 98 F | BODY MASS INDEX: 29.26 KG/M2 | WEIGHT: 160 LBS | SYSTOLIC BLOOD PRESSURE: 142 MMHG

## 2025-02-06 DIAGNOSIS — F45.8 OTHER SOMATOFORM DISORDERS: ICD-10-CM

## 2025-02-06 DIAGNOSIS — Z98.890 OTHER SPECIFIED POSTPROCEDURAL STATES: Chronic | ICD-10-CM

## 2025-02-06 DIAGNOSIS — Z98.891 HISTORY OF UTERINE SCAR FROM PREVIOUS SURGERY: Chronic | ICD-10-CM

## 2025-02-06 DIAGNOSIS — Z00.00 ENCOUNTER FOR GENERAL ADULT MEDICAL EXAMINATION WITHOUT ABNORMAL FINDINGS: ICD-10-CM

## 2025-02-06 PROCEDURE — G0463: CPT

## 2025-03-10 ENCOUNTER — OUTPATIENT (OUTPATIENT)
Dept: OUTPATIENT SERVICES | Facility: HOSPITAL | Age: 45
LOS: 1 days | End: 2025-03-10
Payer: SELF-PAY

## 2025-03-10 ENCOUNTER — APPOINTMENT (OUTPATIENT)
Age: 45
End: 2025-03-10

## 2025-03-10 VITALS
HEART RATE: 81 BPM | WEIGHT: 158 LBS | OXYGEN SATURATION: 95 % | BODY MASS INDEX: 28.9 KG/M2 | DIASTOLIC BLOOD PRESSURE: 88 MMHG | TEMPERATURE: 98.1 F | RESPIRATION RATE: 17 BRPM | SYSTOLIC BLOOD PRESSURE: 142 MMHG

## 2025-03-10 DIAGNOSIS — F45.8 OTHER SOMATOFORM DISORDERS: ICD-10-CM

## 2025-03-10 DIAGNOSIS — Z98.890 OTHER SPECIFIED POSTPROCEDURAL STATES: Chronic | ICD-10-CM

## 2025-03-10 DIAGNOSIS — Z00.00 ENCOUNTER FOR GENERAL ADULT MEDICAL EXAMINATION WITHOUT ABNORMAL FINDINGS: ICD-10-CM

## 2025-03-10 DIAGNOSIS — R10.13 EPIGASTRIC PAIN: ICD-10-CM

## 2025-03-10 DIAGNOSIS — Z98.891 HISTORY OF UTERINE SCAR FROM PREVIOUS SURGERY: Chronic | ICD-10-CM

## 2025-03-10 PROCEDURE — G0463: CPT

## 2025-04-29 ENCOUNTER — NON-APPOINTMENT (OUTPATIENT)
Age: 45
End: 2025-04-29

## 2025-06-02 ENCOUNTER — OUTPATIENT (OUTPATIENT)
Dept: OUTPATIENT SERVICES | Facility: HOSPITAL | Age: 45
LOS: 1 days | End: 2025-06-02
Payer: SELF-PAY

## 2025-06-02 ENCOUNTER — OUTPATIENT (OUTPATIENT)
Dept: OUTPATIENT SERVICES | Facility: HOSPITAL | Age: 45
LOS: 1 days | End: 2025-06-02
Payer: COMMERCIAL

## 2025-06-02 ENCOUNTER — APPOINTMENT (OUTPATIENT)
Age: 45
End: 2025-06-02

## 2025-06-02 VITALS
TEMPERATURE: 98.1 F | BODY MASS INDEX: 29.45 KG/M2 | WEIGHT: 161 LBS | OXYGEN SATURATION: 98 % | HEART RATE: 97 BPM | SYSTOLIC BLOOD PRESSURE: 145 MMHG | DIASTOLIC BLOOD PRESSURE: 80 MMHG | RESPIRATION RATE: 17 BRPM

## 2025-06-02 DIAGNOSIS — Z12.11 ENCOUNTER FOR SCREENING FOR MALIGNANT NEOPLASM OF COLON: ICD-10-CM

## 2025-06-02 DIAGNOSIS — N60.01 SOLITARY CYST OF RIGHT BREAST: ICD-10-CM

## 2025-06-02 DIAGNOSIS — Z98.891 HISTORY OF UTERINE SCAR FROM PREVIOUS SURGERY: Chronic | ICD-10-CM

## 2025-06-02 DIAGNOSIS — F45.8 OTHER SOMATOFORM DISORDERS: ICD-10-CM

## 2025-06-02 DIAGNOSIS — Z98.890 OTHER SPECIFIED POSTPROCEDURAL STATES: Chronic | ICD-10-CM

## 2025-06-02 DIAGNOSIS — Z00.00 ENCOUNTER FOR GENERAL ADULT MEDICAL EXAMINATION WITHOUT ABNORMAL FINDINGS: ICD-10-CM

## 2025-06-02 PROCEDURE — G0463: CPT

## 2025-06-03 DIAGNOSIS — F45.8 OTHER SOMATOFORM DISORDERS: ICD-10-CM

## 2025-06-03 DIAGNOSIS — N60.01 SOLITARY CYST OF RIGHT BREAST: ICD-10-CM

## 2025-07-01 ENCOUNTER — RESULT REVIEW (OUTPATIENT)
Age: 45
End: 2025-07-01

## 2025-07-01 ENCOUNTER — OUTPATIENT (OUTPATIENT)
Dept: OUTPATIENT SERVICES | Facility: HOSPITAL | Age: 45
LOS: 1 days | End: 2025-07-01
Payer: COMMERCIAL

## 2025-07-01 ENCOUNTER — APPOINTMENT (OUTPATIENT)
Dept: ULTRASOUND IMAGING | Facility: HOSPITAL | Age: 45
End: 2025-07-01
Payer: COMMERCIAL

## 2025-07-01 DIAGNOSIS — Z98.891 HISTORY OF UTERINE SCAR FROM PREVIOUS SURGERY: Chronic | ICD-10-CM

## 2025-07-01 DIAGNOSIS — Z98.890 OTHER SPECIFIED POSTPROCEDURAL STATES: Chronic | ICD-10-CM

## 2025-07-01 DIAGNOSIS — N60.01 SOLITARY CYST OF RIGHT BREAST: ICD-10-CM

## 2025-07-01 PROCEDURE — 76642 ULTRASOUND BREAST LIMITED: CPT | Mod: 26,RT

## 2025-07-01 PROCEDURE — 76642 ULTRASOUND BREAST LIMITED: CPT

## 2025-07-09 NOTE — ED ADULT TRIAGE NOTE - MODE OF ARRIVAL
Problem: PAIN - ADULT  Goal: Verbalizes/displays adequate comfort level or baseline comfort level  Description: Interventions:  - Encourage patient to monitor pain and request assistance  - Assess pain using appropriate pain scale  - Administer analgesics as ordered based on type and severity of pain and evaluate response  - Implement non-pharmacological measures as appropriate and evaluate response  - Consider cultural and social influences on pain and pain management  - Notify physician/advanced practitioner if interventions unsuccessful or patient reports new pain  - Educate patient/family on pain management process including their role and importance of  reporting pain   - Provide non-pharmacologic/complimentary pain relief interventions  7/9/2025 0607 by Rowan Logan RN  Outcome: Progressing  7/8/2025 2245 by Rowan Logan RN  Outcome: Progressing     Problem: INFECTION - ADULT  Goal: Absence or prevention of progression during hospitalization  Description: INTERVENTIONS:  - Assess and monitor for signs and symptoms of infection  - Monitor lab/diagnostic results  - Monitor all insertion sites, i.e. indwelling lines, tubes, and drains  - Monitor endotracheal if appropriate and nasal secretions for changes in amount and color  - [place] appropriate cooling/warming therapies per order  - Administer medications as ordered  - Instruct and encourage patient and family to use good hand hygiene technique  - Identify and instruct in appropriate isolation precautions for identified infection/condition  7/9/2025 0607 by Rowan Logan RN  Outcome: Progressing  7/8/2025 2245 by Rowan Logan RN  Outcome: Progressing  Goal: Absence of fever/infection during neutropenic period  Description: INTERVENTIONS:  - Monitor WBC  - Perform strict hand hygiene  - Limit to healthy visitors only  - No plants, dried, fresh or silk flowers with watt in patient room  7/9/2025 0607 by Rowan Logan  RN  Outcome: Progressing  7/8/2025 2245 by Rowan Logan RN  Outcome: Progressing     Problem: INFECTION - ADULT  Goal: Absence or prevention of progression during hospitalization  Description: INTERVENTIONS:  - Assess and monitor for signs and symptoms of infection  - Monitor lab/diagnostic results  - Monitor all insertion sites, i.e. indwelling lines, tubes, and drains  - Monitor endotracheal if appropriate and nasal secretions for changes in amount and color  - [place] appropriate cooling/warming therapies per order  - Administer medications as ordered  - Instruct and encourage patient and family to use good hand hygiene technique  - Identify and instruct in appropriate isolation precautions for identified infection/condition  7/9/2025 0607 by Rowan Logan RN  Outcome: Progressing  7/8/2025 2245 by Rowan Logan RN  Outcome: Progressing  Goal: Absence of fever/infection during neutropenic period  Description: INTERVENTIONS:  - Monitor WBC  - Perform strict hand hygiene  - Limit to healthy visitors only  - No plants, dried, fresh or silk flowers with watt in patient room  7/9/2025 0607 by Rowan Logan RN  Outcome: Progressing  7/8/2025 2245 by Rowan Logan RN  Outcome: Progressing      Private [employer] Walk in

## 2025-09-11 ENCOUNTER — APPOINTMENT (OUTPATIENT)
Age: 45
End: 2025-09-11

## 2025-09-11 VITALS
RESPIRATION RATE: 16 BRPM | DIASTOLIC BLOOD PRESSURE: 77 MMHG | BODY MASS INDEX: 29.63 KG/M2 | SYSTOLIC BLOOD PRESSURE: 136 MMHG | HEART RATE: 76 BPM | OXYGEN SATURATION: 98 % | WEIGHT: 162 LBS | TEMPERATURE: 98.6 F

## 2025-09-11 DIAGNOSIS — N60.01 SOLITARY CYST OF RIGHT BREAST: ICD-10-CM

## 2025-09-11 DIAGNOSIS — M62.830 MUSCLE SPASM OF BACK: ICD-10-CM

## 2025-09-11 DIAGNOSIS — R09.82 POSTNASAL DRIP: ICD-10-CM

## 2025-09-11 DIAGNOSIS — N64.4 MASTODYNIA: ICD-10-CM

## 2025-09-11 DIAGNOSIS — R92.8 OTHER ABNORMAL AND INCONCLUSIVE FINDINGS ON DIAGNOSTIC IMAGING OF BREAST: ICD-10-CM

## 2025-09-11 RX ORDER — FLUTICASONE FUROATE 27.5 UG/1
27.5 SPRAY, METERED NASAL DAILY
Qty: 1 | Refills: 0 | Status: ACTIVE | COMMUNITY
Start: 2025-09-11 | End: 1900-01-01

## (undated) DEVICE — PACK IV START WITH CHG

## (undated) DEVICE — TUBING IV SET GRAVITY 3Y 100" MACRO

## (undated) DEVICE — SENSOR O2 FINGER ADULT

## (undated) DEVICE — SOL INJ NS 0.9% 500ML 2 PORT

## (undated) DEVICE — SUCTION YANKAUER NO CONTROL VENT

## (undated) DEVICE — CATH IV SAFE BC 22G X 1" (BLUE)

## (undated) DEVICE — TUBING SUCTION CONN 6FT STERILE

## (undated) DEVICE — FOLEY HOLDER STATLOCK 2 WAY ADULT

## (undated) DEVICE — TUBING SUCTION 20FT

## (undated) DEVICE — BITE BLOCK ADULT 20 X 27MM (GREEN)

## (undated) DEVICE — SYR ALLIANCE II INFLATION 60ML

## (undated) DEVICE — BALLOON US ENDO

## (undated) DEVICE — BIOPSY FORCEP RADIAL JAW 4 STANDARD WITH NEEDLE

## (undated) DEVICE — CATH IV SAFE BC 20G X 1.16" (PINK)